# Patient Record
Sex: FEMALE | Race: WHITE | NOT HISPANIC OR LATINO | Employment: OTHER | URBAN - METROPOLITAN AREA
[De-identification: names, ages, dates, MRNs, and addresses within clinical notes are randomized per-mention and may not be internally consistent; named-entity substitution may affect disease eponyms.]

---

## 2018-02-03 ENCOUNTER — OFFICE VISIT (OUTPATIENT)
Dept: URGENT CARE | Facility: CLINIC | Age: 43
End: 2018-02-03
Payer: COMMERCIAL

## 2018-02-03 VITALS
HEART RATE: 103 BPM | HEIGHT: 64 IN | OXYGEN SATURATION: 98 % | DIASTOLIC BLOOD PRESSURE: 96 MMHG | BODY MASS INDEX: 31.92 KG/M2 | RESPIRATION RATE: 20 BRPM | TEMPERATURE: 98.7 F | SYSTOLIC BLOOD PRESSURE: 134 MMHG | WEIGHT: 187 LBS

## 2018-02-03 DIAGNOSIS — R05.9 COUGH: Primary | ICD-10-CM

## 2018-02-03 PROCEDURE — S9088 SERVICES PROVIDED IN URGENT: HCPCS

## 2018-02-03 PROCEDURE — 99203 OFFICE O/P NEW LOW 30 MIN: CPT

## 2018-02-03 NOTE — PROGRESS NOTES
[facility]        NAME: Chantell Strickland is a 38 y o  female  : 1975    MRN: [id number]  DATE: February 3, 2018  TIME: 12:07 PM    Assessment and Plan   No primary diagnosis found  No diagnosis found  Patient Instructions     Follow up with PCP in 3-5 days  Proceed to  ER if symptoms worsen  Chief Complaint     Chief Complaint   Patient presents with    Cold Like Symptoms     cough and decreased appetite for a little over a week  was put on a z pack one week ago, still having symptoms         History of Present Illness   Chantell Strickland presents to the clinic c/o    She presents today with her mother  Mom says she has had a cough for the last few days  No other complaints of fever sore throat headache chest congestion  Mom is concerned about possible strep throat  She has had some sick contacts  Review of Systems   Review of Systems   Constitutional: Negative  HENT: Negative  Eyes: Negative  Respiratory: Positive for cough  Negative for chest tightness, shortness of breath and wheezing  Cardiovascular: Negative  Gastrointestinal: Negative  Endocrine: Negative  Genitourinary: Negative  Musculoskeletal: Negative  Allergic/Immunologic: Negative  Neurological: Negative  Hematological: Negative  Current Medications     No long-term prescriptions on file         Current Allergies     Allergies as of 2018    (No Known Allergies)            The following portions of the patient's history were reviewed and updated as appropriate: allergies, current medications, past family history, past medical history, past social history, past surgical history and problem list     Objective   /96 (BP Location: Right arm, Patient Position: Sitting, Cuff Size: Adult)   Pulse 103   Temp 98 7 °F (37 1 °C) (Tympanic)   Resp 20   Ht 5' 4" (1 626 m)   Wt 84 8 kg (187 lb)   SpO2 98%   BMI 32 10 kg/m²        Physical Exam     Physical Exam Constitutional: She is oriented to person, place, and time  She appears well-developed  HENT:   Head: Normocephalic and atraumatic  Eyes: EOM are normal  Pupils are equal, round, and reactive to light  Neck: Normal range of motion  Cardiovascular: Normal rate and regular rhythm  Pulmonary/Chest: Effort normal and breath sounds normal    Abdominal: Soft  Bowel sounds are normal    Musculoskeletal: Normal range of motion  Neurological: She is alert and oriented to person, place, and time  Skin: Skin is warm and dry  Nursing note and vitals reviewed

## 2018-02-03 NOTE — PATIENT INSTRUCTIONS
I recommended that she continue supportive care rest and fluids  Follow-up if not a lot better in 3-5 days

## 2019-03-23 ENCOUNTER — APPOINTMENT (OUTPATIENT)
Dept: RADIOLOGY | Facility: CLINIC | Age: 44
End: 2019-03-23
Payer: COMMERCIAL

## 2019-03-23 ENCOUNTER — OFFICE VISIT (OUTPATIENT)
Dept: URGENT CARE | Facility: CLINIC | Age: 44
End: 2019-03-23
Payer: COMMERCIAL

## 2019-03-23 VITALS
BODY MASS INDEX: 30.9 KG/M2 | HEART RATE: 91 BPM | RESPIRATION RATE: 16 BRPM | OXYGEN SATURATION: 99 % | TEMPERATURE: 97.6 F | WEIGHT: 181 LBS | DIASTOLIC BLOOD PRESSURE: 88 MMHG | SYSTOLIC BLOOD PRESSURE: 126 MMHG | HEIGHT: 64 IN

## 2019-03-23 DIAGNOSIS — S80.01XA CONTUSION OF RIGHT KNEE, INITIAL ENCOUNTER: Primary | ICD-10-CM

## 2019-03-23 DIAGNOSIS — M25.561 ACUTE PAIN OF RIGHT KNEE: ICD-10-CM

## 2019-03-23 DIAGNOSIS — S20.212A RIB CONTUSION, LEFT, INITIAL ENCOUNTER: ICD-10-CM

## 2019-03-23 DIAGNOSIS — R07.81 RIB PAIN ON LEFT SIDE: ICD-10-CM

## 2019-03-23 PROCEDURE — 99213 OFFICE O/P EST LOW 20 MIN: CPT | Performed by: PHYSICIAN ASSISTANT

## 2019-03-23 PROCEDURE — 71101 X-RAY EXAM UNILAT RIBS/CHEST: CPT

## 2019-03-23 PROCEDURE — 73564 X-RAY EXAM KNEE 4 OR MORE: CPT

## 2019-03-23 PROCEDURE — S9088 SERVICES PROVIDED IN URGENT: HCPCS | Performed by: PHYSICIAN ASSISTANT

## 2019-03-23 RX ORDER — FLUOXETINE 20 MG/1
20 TABLET, FILM COATED ORAL DAILY
COMMUNITY

## 2019-03-23 NOTE — PROGRESS NOTES
[facility]        NAME: Navid Hess is a 41 y o  female  : 1975    MRN: [id number]  DATE: 2019  TIME: 1:57 PM    Assessment and Plan   Acute pain of right knee [M25 561]  1  Acute pain of right knee  XR knee 4+ vw right injury   2  Rib pain on left side  XR ribs left w pa chest min 3 views         Patient Instructions     RICE as instructed  Tylenol as needed for pain  Follow up with PCP in 3-5 days  Proceed to  ER if symptoms worsen  Chief Complaint     Chief Complaint   Patient presents with    Fall     hit furniture at 0500, bruising         History of Present Illness       Fall   Incident onset: 9 hours ago  The fall occurred while walking  Impact surface: fell into wooden bedside table  There was no blood loss  The point of impact was the head and right knee (left chest)  The pain is present in the right knee  The pain is moderate  The symptoms are aggravated by ambulation  Pertinent negatives include no abdominal pain, bowel incontinence, fever, headaches, hearing loss, hematuria, loss of consciousness, nausea, numbness, tingling, visual change or vomiting  Associated symptoms comments: Patient denies nausea, vomiting, dizziness, lightheadedness, weakness, numbness    She has tried nothing for the symptoms  Review of Systems   Review of Systems   Constitutional: Negative for activity change, appetite change, chills, diaphoresis, fatigue, fever and unexpected weight change  Respiratory: Negative for apnea, cough, choking, chest tightness, shortness of breath, wheezing and stridor  Cardiovascular: Negative for chest pain, palpitations and leg swelling  Gastrointestinal: Negative for abdominal pain, bowel incontinence, nausea and vomiting  Genitourinary: Negative for hematuria  Musculoskeletal: Positive for arthralgias and myalgias  Negative for back pain, gait problem, joint swelling, neck pain and neck stiffness     Neurological: Negative for tingling, loss of consciousness, numbness and headaches  Current Medications       Current Outpatient Medications:     FLUoxetine (PROzac) 20 MG tablet, Take 20 mg by mouth daily, Disp: , Rfl:     Levothyroxine Sodium (SYNTHROID PO), Take by mouth, Disp: , Rfl:     Topiramate (TOPAMAX PO), Take by mouth, Disp: , Rfl:     Current Allergies     Allergies as of 03/23/2019    (No Known Allergies)            The following portions of the patient's history were reviewed and updated as appropriate: allergies, current medications, past family history, past medical history, past social history, past surgical history and problem list      Past Medical History:   Diagnosis Date    Anxiety     Depression     Hypothyroid     Mental retardation        Past Surgical History:   Procedure Laterality Date    HERNIA REPAIR      NO PAST SURGERIES         No family history on file  Medications have been verified  Objective   /88   Pulse 91   Temp 97 6 °F (36 4 °C) (Tympanic)   Resp 16   Ht 5' 4" (1 626 m)   Wt 82 1 kg (181 lb)   LMP 03/01/2019 (Approximate) Comment: birth control  SpO2 99%   BMI 31 07 kg/m²        Physical Exam     Physical Exam   Constitutional: She appears well-developed and well-nourished  Cardiovascular: Normal rate, regular rhythm and normal heart sounds  Exam reveals no gallop and no friction rub  No murmur heard  Pulmonary/Chest: Effort normal and breath sounds normal  No stridor  No respiratory distress  She has no wheezes  She has no rales  Abdominal: Soft  Bowel sounds are normal  She exhibits no distension and no mass  There is no tenderness  There is no rebound and no guarding  Musculoskeletal:        Right knee: She exhibits ecchymosis  She exhibits normal range of motion, no swelling, no effusion, no deformity, no laceration, no erythema, normal alignment, no LCL laxity, normal patellar mobility, no bony tenderness, normal meniscus and no MCL laxity   Tenderness (to distal quad overlying 5cm blue ecchymosis) found     TTP overlying blue ecchymosis to left breast at level of 4th rib

## 2019-06-04 ENCOUNTER — OFFICE VISIT (OUTPATIENT)
Dept: URGENT CARE | Facility: CLINIC | Age: 44
End: 2019-06-04
Payer: COMMERCIAL

## 2019-06-04 ENCOUNTER — APPOINTMENT (OUTPATIENT)
Dept: RADIOLOGY | Facility: CLINIC | Age: 44
End: 2019-06-04
Payer: COMMERCIAL

## 2019-06-04 VITALS
SYSTOLIC BLOOD PRESSURE: 166 MMHG | HEART RATE: 104 BPM | HEIGHT: 64 IN | DIASTOLIC BLOOD PRESSURE: 106 MMHG | TEMPERATURE: 96.8 F | OXYGEN SATURATION: 98 % | WEIGHT: 180 LBS | RESPIRATION RATE: 18 BRPM | BODY MASS INDEX: 30.73 KG/M2

## 2019-06-04 DIAGNOSIS — S93.402A SPRAIN OF LEFT ANKLE, UNSPECIFIED LIGAMENT, INITIAL ENCOUNTER: Primary | ICD-10-CM
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
## 2019-06-04 PROCEDURE — 73610 X-RAY EXAM OF ANKLE: CPT

## 2019-06-04 PROCEDURE — S9088 SERVICES PROVIDED IN URGENT: HCPCS | Performed by: PHYSICIAN ASSISTANT

## 2019-06-04 PROCEDURE — 99213 OFFICE O/P EST LOW 20 MIN: CPT | Performed by: PHYSICIAN ASSISTANT

## 2019-06-04 RX ORDER — ELASTIC BANDAGE 3"
BANDAGE TOPICAL
Qty: 1 EACH | Refills: 0 | Status: SHIPPED | COMMUNITY
Start: 2019-06-04 | End: 2022-05-31

## 2019-06-04 RX ORDER — LEVOTHYROXINE SODIUM 0.1 MG/1
100 TABLET ORAL DAILY
Refills: 3 | COMMUNITY
Start: 2019-05-04

## 2019-06-04 RX ORDER — NORETHINDRONE ACETATE AND ETHINYL ESTRADIOL AND FERROUS FUMARATE 1.5-30(21)
1 KIT ORAL DAILY
Refills: 1 | COMMUNITY
Start: 2019-05-13

## 2019-06-04 RX ORDER — TOPIRAMATE 50 MG/1
TABLET, FILM COATED ORAL
Refills: 5 | COMMUNITY
Start: 2019-05-12 | End: 2019-06-04

## 2019-06-04 RX ORDER — FLUOXETINE HYDROCHLORIDE 20 MG/1
CAPSULE ORAL
Refills: 0 | COMMUNITY
Start: 2019-04-07 | End: 2019-06-04

## 2021-04-07 ENCOUNTER — IMMUNIZATIONS (OUTPATIENT)
Dept: FAMILY MEDICINE CLINIC | Facility: HOSPITAL | Age: 46
End: 2021-04-07

## 2021-04-07 DIAGNOSIS — Z23 ENCOUNTER FOR IMMUNIZATION: Primary | ICD-10-CM

## 2021-04-07 PROCEDURE — 91300 SARS-COV-2 / COVID-19 MRNA VACCINE (PFIZER-BIONTECH) 30 MCG: CPT

## 2021-04-07 PROCEDURE — 0001A SARS-COV-2 / COVID-19 MRNA VACCINE (PFIZER-BIONTECH) 30 MCG: CPT

## 2021-04-28 ENCOUNTER — IMMUNIZATIONS (OUTPATIENT)
Dept: FAMILY MEDICINE CLINIC | Facility: HOSPITAL | Age: 46
End: 2021-04-28

## 2021-04-28 DIAGNOSIS — Z23 ENCOUNTER FOR IMMUNIZATION: Primary | ICD-10-CM

## 2021-04-28 PROCEDURE — 0002A SARS-COV-2 / COVID-19 MRNA VACCINE (PFIZER-BIONTECH) 30 MCG: CPT

## 2021-04-28 PROCEDURE — 91300 SARS-COV-2 / COVID-19 MRNA VACCINE (PFIZER-BIONTECH) 30 MCG: CPT

## 2021-10-15 ENCOUNTER — OFFICE VISIT (OUTPATIENT)
Dept: ENDOCRINOLOGY | Facility: CLINIC | Age: 46
End: 2021-10-15
Payer: COMMERCIAL

## 2021-10-15 ENCOUNTER — APPOINTMENT (OUTPATIENT)
Dept: LAB | Facility: CLINIC | Age: 46
End: 2021-10-15
Payer: COMMERCIAL

## 2021-10-15 VITALS
BODY MASS INDEX: 34.48 KG/M2 | HEART RATE: 81 BPM | DIASTOLIC BLOOD PRESSURE: 88 MMHG | HEIGHT: 62 IN | WEIGHT: 187.4 LBS | SYSTOLIC BLOOD PRESSURE: 122 MMHG

## 2021-10-15 DIAGNOSIS — E66.09 CLASS 1 OBESITY DUE TO EXCESS CALORIES WITHOUT SERIOUS COMORBIDITY WITH BODY MASS INDEX (BMI) OF 34.0 TO 34.9 IN ADULT: ICD-10-CM

## 2021-10-15 DIAGNOSIS — R63.1 POLYDIPSIA: Primary | ICD-10-CM

## 2021-10-15 DIAGNOSIS — E03.9 HYPOTHYROIDISM, UNSPECIFIED TYPE: ICD-10-CM

## 2021-10-15 PROBLEM — E66.811 CLASS 1 OBESITY DUE TO EXCESS CALORIES WITHOUT SERIOUS COMORBIDITY WITH BODY MASS INDEX (BMI) OF 34.0 TO 34.9 IN ADULT: Status: ACTIVE | Noted: 2021-10-15

## 2021-10-15 LAB
ANION GAP SERPL CALCULATED.3IONS-SCNC: 6 MMOL/L (ref 4–13)
BUN SERPL-MCNC: 8 MG/DL (ref 5–25)
CALCIUM SERPL-MCNC: 8.9 MG/DL (ref 8.3–10.1)
CHLORIDE SERPL-SCNC: 112 MMOL/L (ref 100–108)
CO2 SERPL-SCNC: 20 MMOL/L (ref 21–32)
CREAT SERPL-MCNC: 0.88 MG/DL (ref 0.6–1.3)
EST. AVERAGE GLUCOSE BLD GHB EST-MCNC: 100 MG/DL
GFR SERPL CREATININE-BSD FRML MDRD: 80 ML/MIN/1.73SQ M
GLUCOSE SERPL-MCNC: 106 MG/DL (ref 65–140)
HBA1C MFR BLD: 5.1 %
POTASSIUM SERPL-SCNC: 3.2 MMOL/L (ref 3.5–5.3)
SODIUM SERPL-SCNC: 138 MMOL/L (ref 136–145)

## 2021-10-15 PROCEDURE — 80048 BASIC METABOLIC PNL TOTAL CA: CPT

## 2021-10-15 PROCEDURE — 36415 COLL VENOUS BLD VENIPUNCTURE: CPT

## 2021-10-15 PROCEDURE — 99243 OFF/OP CNSLTJ NEW/EST LOW 30: CPT | Performed by: STUDENT IN AN ORGANIZED HEALTH CARE EDUCATION/TRAINING PROGRAM

## 2021-10-15 PROCEDURE — 83036 HEMOGLOBIN GLYCOSYLATED A1C: CPT

## 2021-10-18 ENCOUNTER — TELEPHONE (OUTPATIENT)
Dept: ENDOCRINOLOGY | Facility: CLINIC | Age: 46
End: 2021-10-18

## 2022-02-11 ENCOUNTER — OFFICE VISIT (OUTPATIENT)
Dept: ENDOCRINOLOGY | Facility: CLINIC | Age: 47
End: 2022-02-11
Payer: COMMERCIAL

## 2022-02-11 VITALS
BODY MASS INDEX: 33.19 KG/M2 | HEIGHT: 64 IN | SYSTOLIC BLOOD PRESSURE: 126 MMHG | WEIGHT: 194.4 LBS | HEART RATE: 89 BPM | DIASTOLIC BLOOD PRESSURE: 62 MMHG

## 2022-02-11 DIAGNOSIS — E87.6 HYPOKALEMIA: ICD-10-CM

## 2022-02-11 DIAGNOSIS — E66.09 CLASS 1 OBESITY DUE TO EXCESS CALORIES WITHOUT SERIOUS COMORBIDITY WITH BODY MASS INDEX (BMI) OF 34.0 TO 34.9 IN ADULT: ICD-10-CM

## 2022-02-11 DIAGNOSIS — E03.9 HYPOTHYROIDISM, UNSPECIFIED TYPE: Primary | ICD-10-CM

## 2022-02-11 PROCEDURE — 99214 OFFICE O/P EST MOD 30 MIN: CPT | Performed by: STUDENT IN AN ORGANIZED HEALTH CARE EDUCATION/TRAINING PROGRAM

## 2022-02-11 NOTE — PROGRESS NOTES
Estrellita Monreal 42 y o  female MRN: [id number]    Encounter: [id number]      Assessment/Plan     Problem List Items Addressed This Visit        Endocrine    Hypothyroidism - Primary     Clinically and biochemically euthyroid on present dose of levothyroxine 100 mcg daily  Will repeat thyroid labs prior to next visit         Relevant Orders    T4, free Lab Collect    TSH, 3rd generation Lab Collect       Other    Class 1 obesity due to excess calories without serious comorbidity with body mass index (BMI) of 34 0 to 34 9 in adult     Counseled patient on benefits of physical activity, particularly as it pertains to health and mood  Hypokalemia     Mild hypokalemia x1  Will follow up repeat chemistry         Relevant Orders    Basic metabolic panel Lab Collect            CC: Hypothyroidism    History of Present Illness     HPI:    Ashley Gale returns today for follow up  As usual, she is joined by her mom  Ashley Gale continues on levothyroxine 100 mcg daily  She denies any hyper- or hypothyroid symptoms She denies any neck complaints either  She remains out of work and this has been hard on her  She spends most for day by herself and has not done much to stay active  She is hoping she may find some more structure to her day  Review of Systems   Constitutional: Negative for fatigue and fever  HENT: Negative for trouble swallowing and voice change  Eyes: Negative for photophobia and visual disturbance  Respiratory: Negative for cough and shortness of breath  Cardiovascular: Negative for chest pain, palpitations and leg swelling  Gastrointestinal: Negative for abdominal pain and diarrhea  Endocrine: Negative for cold intolerance and heat intolerance  Skin: Negative for color change and pallor  Neurological: Negative for tremors, weakness and headaches  Psychiatric/Behavioral: Negative for agitation and behavioral problems         Historical Information   Past Medical History:   Diagnosis Date    Anxiety     Depression     Hypothyroid     Mental retardation      Past Surgical History:   Procedure Laterality Date    HERNIA REPAIR      NO PAST SURGERIES       Social History   Social History     Substance and Sexual Activity   Alcohol Use Never     Social History     Substance and Sexual Activity   Drug Use Never     Social History     Tobacco Use   Smoking Status Never Smoker   Smokeless Tobacco Never Used     Family History:   Family History   Problem Relation Age of Onset    Stroke Father     Hypertension Maternal Grandmother     Hypertension Maternal Grandfather        Meds/Allergies   Current Outpatient Medications   Medication Sig Dispense Refill    Elastic Bandages & Supports (ACE BANDAGE SELF-ADHERING) MISC Placed over L foot and ankle  1 each 0    FLUoxetine (PROzac) 20 MG tablet Take 20 mg by mouth daily      JUNEL FE 1 5/30 1 5-30 MG-MCG tablet Take 1 tablet by mouth daily  1    levothyroxine 100 mcg tablet Take 100 mcg by mouth daily  3    Topiramate (TOPAMAX PO) Take 50 mg by mouth 2 (two) times a day        No current facility-administered medications for this visit  No Known Allergies    Objective   Vitals: Blood pressure 126/62, pulse 89, height 5' 4" (1 626 m), weight 88 2 kg (194 lb 6 4 oz)  Physical Exam  Vitals reviewed  Constitutional:       Appearance: She is not ill-appearing  HENT:      Head: Normocephalic and atraumatic  Nose: Nose normal    Eyes:      General: No scleral icterus  Conjunctiva/sclera: Conjunctivae normal    Neck:      Thyroid: No thyroid mass, thyromegaly or thyroid tenderness  Cardiovascular:      Rate and Rhythm: Normal rate and regular rhythm  Pulses: Normal pulses  Heart sounds: No murmur heard  Pulmonary:      Effort: Pulmonary effort is normal  No respiratory distress  Abdominal:      Palpations: Abdomen is soft  Tenderness: There is no abdominal tenderness     Musculoskeletal:      Cervical back: Neck supple  Lymphadenopathy:      Cervical: No cervical adenopathy  Skin:     General: Skin is warm and dry  Neurological:      Mental Status: She is alert  Psychiatric:         Mood and Affect: Mood normal          Behavior: Behavior normal          The history was obtained from the review of the chart, patient and family  Lab Results:       Component      Latest Ref Rng & Units 10/15/2021   Sodium      136 - 145 mmol/L 138   Potassium      3 5 - 5 3 mmol/L 3 2 (L)   Chloride      100 - 108 mmol/L 112 (H)   CO2      21 - 32 mmol/L 20 (L)   Anion Gap      4 - 13 mmol/L 6   BUN      5 - 25 mg/dL 8   Creatinine      0 60 - 1 30 mg/dL 0 88   Glucose, Random      65 - 140 mg/dL 106   Calcium      8 3 - 10 1 mg/dL 8 9   eGFR      ml/min/1 73sq m 80   Hemoglobin A1C      Normal 3 8-5 6%; PreDiabetic 5 7-6 4%; Diabetic >=6 5%; Glycemic control for adults with diabetes <7 0% % 5 1   eAG, EST AVG Glucose      mg/dl 100      Ref Range & Units 9/8/21  2:24 PM   Thyroid Stimulating Hormone 0 36 - 3 74 uIU/mL 1 89       Ref Range & Units 9/8/21  2:24 PM   T4, Free 0 76 - 1 46 ng/dL 1 14        Imaging Studies:       I have personally reviewed pertinent reports  Portions of the record may have been created with voice recognition software  Occasional wrong word or "sound a like" substitutions may have occurred due to the inherent limitations of voice recognition software  Read the chart carefully and recognize, using context, where substitutions have occurred

## 2022-02-11 NOTE — ASSESSMENT & PLAN NOTE
Clinically and biochemically euthyroid on present dose of levothyroxine 100 mcg daily   Will repeat thyroid labs prior to next visit

## 2022-05-31 ENCOUNTER — APPOINTMENT (EMERGENCY)
Dept: RADIOLOGY | Facility: HOSPITAL | Age: 47
End: 2022-05-31
Payer: COMMERCIAL

## 2022-05-31 ENCOUNTER — HOSPITAL ENCOUNTER (EMERGENCY)
Facility: HOSPITAL | Age: 47
Discharge: ANOTHER HOSPITAL | End: 2022-05-31
Attending: STUDENT IN AN ORGANIZED HEALTH CARE EDUCATION/TRAINING PROGRAM | Admitting: STUDENT IN AN ORGANIZED HEALTH CARE EDUCATION/TRAINING PROGRAM
Payer: COMMERCIAL

## 2022-05-31 VITALS
SYSTOLIC BLOOD PRESSURE: 163 MMHG | TEMPERATURE: 97.1 F | RESPIRATION RATE: 18 BRPM | BODY MASS INDEX: 32.97 KG/M2 | HEIGHT: 64 IN | HEART RATE: 91 BPM | DIASTOLIC BLOOD PRESSURE: 87 MMHG | OXYGEN SATURATION: 99 % | WEIGHT: 193.12 LBS

## 2022-05-31 DIAGNOSIS — M65.9 FLEXOR TENOSYNOVITIS OF FINGER: Primary | ICD-10-CM

## 2022-05-31 LAB
ANION GAP SERPL CALCULATED.3IONS-SCNC: 10 MMOL/L (ref 4–13)
BASOPHILS # BLD AUTO: 0.03 THOUSANDS/ΜL (ref 0–0.1)
BASOPHILS NFR BLD AUTO: 0 % (ref 0–1)
BUN SERPL-MCNC: 6 MG/DL (ref 5–25)
CALCIUM SERPL-MCNC: 8.2 MG/DL (ref 8.3–10.1)
CHLORIDE SERPL-SCNC: 108 MMOL/L (ref 100–108)
CO2 SERPL-SCNC: 19 MMOL/L (ref 21–32)
CREAT SERPL-MCNC: 0.77 MG/DL (ref 0.6–1.3)
CRP SERPL QL: 37.4 MG/L
EOSINOPHIL # BLD AUTO: 0.19 THOUSAND/ΜL (ref 0–0.61)
EOSINOPHIL NFR BLD AUTO: 2 % (ref 0–6)
ERYTHROCYTE [DISTWIDTH] IN BLOOD BY AUTOMATED COUNT: 13.9 % (ref 11.6–15.1)
ERYTHROCYTE [SEDIMENTATION RATE] IN BLOOD: 58 MM/HOUR (ref 0–19)
FLUAV RNA RESP QL NAA+PROBE: NEGATIVE
FLUBV RNA RESP QL NAA+PROBE: NEGATIVE
GFR SERPL CREATININE-BSD FRML MDRD: 93 ML/MIN/1.73SQ M
GLUCOSE SERPL-MCNC: 87 MG/DL (ref 65–140)
HCT VFR BLD AUTO: 42.8 % (ref 34.8–46.1)
HGB BLD-MCNC: 13.7 G/DL (ref 11.5–15.4)
IMM GRANULOCYTES # BLD AUTO: 0.02 THOUSAND/UL (ref 0–0.2)
IMM GRANULOCYTES NFR BLD AUTO: 0 % (ref 0–2)
LYMPHOCYTES # BLD AUTO: 2.19 THOUSANDS/ΜL (ref 0.6–4.47)
LYMPHOCYTES NFR BLD AUTO: 21 % (ref 14–44)
MCH RBC QN AUTO: 29.9 PG (ref 26.8–34.3)
MCHC RBC AUTO-ENTMCNC: 32 G/DL (ref 31.4–37.4)
MCV RBC AUTO: 93 FL (ref 82–98)
MONOCYTES # BLD AUTO: 0.59 THOUSAND/ΜL (ref 0.17–1.22)
MONOCYTES NFR BLD AUTO: 6 % (ref 4–12)
NEUTROPHILS # BLD AUTO: 7.33 THOUSANDS/ΜL (ref 1.85–7.62)
NEUTS SEG NFR BLD AUTO: 71 % (ref 43–75)
NRBC BLD AUTO-RTO: 0 /100 WBCS
PLATELET # BLD AUTO: 216 THOUSANDS/UL (ref 149–390)
PMV BLD AUTO: 11.5 FL (ref 8.9–12.7)
POTASSIUM SERPL-SCNC: 3.8 MMOL/L (ref 3.5–5.3)
PROCALCITONIN SERPL-MCNC: <0.05 NG/ML
RBC # BLD AUTO: 4.58 MILLION/UL (ref 3.81–5.12)
RSV RNA RESP QL NAA+PROBE: NEGATIVE
SARS-COV-2 RNA RESP QL NAA+PROBE: NEGATIVE
SODIUM SERPL-SCNC: 137 MMOL/L (ref 136–145)
WBC # BLD AUTO: 10.35 THOUSAND/UL (ref 4.31–10.16)

## 2022-05-31 PROCEDURE — 80048 BASIC METABOLIC PNL TOTAL CA: CPT | Performed by: STUDENT IN AN ORGANIZED HEALTH CARE EDUCATION/TRAINING PROGRAM

## 2022-05-31 PROCEDURE — 86140 C-REACTIVE PROTEIN: CPT | Performed by: STUDENT IN AN ORGANIZED HEALTH CARE EDUCATION/TRAINING PROGRAM

## 2022-05-31 PROCEDURE — 0241U HB NFCT DS VIR RESP RNA 4 TRGT: CPT | Performed by: STUDENT IN AN ORGANIZED HEALTH CARE EDUCATION/TRAINING PROGRAM

## 2022-05-31 PROCEDURE — 99284 EMERGENCY DEPT VISIT MOD MDM: CPT | Performed by: STUDENT IN AN ORGANIZED HEALTH CARE EDUCATION/TRAINING PROGRAM

## 2022-05-31 PROCEDURE — 73140 X-RAY EXAM OF FINGER(S): CPT

## 2022-05-31 PROCEDURE — 96365 THER/PROPH/DIAG IV INF INIT: CPT

## 2022-05-31 PROCEDURE — 36415 COLL VENOUS BLD VENIPUNCTURE: CPT | Performed by: STUDENT IN AN ORGANIZED HEALTH CARE EDUCATION/TRAINING PROGRAM

## 2022-05-31 PROCEDURE — 96375 TX/PRO/DX INJ NEW DRUG ADDON: CPT

## 2022-05-31 PROCEDURE — 85652 RBC SED RATE AUTOMATED: CPT | Performed by: STUDENT IN AN ORGANIZED HEALTH CARE EDUCATION/TRAINING PROGRAM

## 2022-05-31 PROCEDURE — 99284 EMERGENCY DEPT VISIT MOD MDM: CPT

## 2022-05-31 PROCEDURE — 87040 BLOOD CULTURE FOR BACTERIA: CPT | Performed by: STUDENT IN AN ORGANIZED HEALTH CARE EDUCATION/TRAINING PROGRAM

## 2022-05-31 PROCEDURE — 84145 PROCALCITONIN (PCT): CPT | Performed by: STUDENT IN AN ORGANIZED HEALTH CARE EDUCATION/TRAINING PROGRAM

## 2022-05-31 PROCEDURE — 85025 COMPLETE CBC W/AUTO DIFF WBC: CPT | Performed by: STUDENT IN AN ORGANIZED HEALTH CARE EDUCATION/TRAINING PROGRAM

## 2022-05-31 RX ORDER — FENTANYL CITRATE 50 UG/ML
50 INJECTION, SOLUTION INTRAMUSCULAR; INTRAVENOUS ONCE
Status: COMPLETED | OUTPATIENT
Start: 2022-05-31 | End: 2022-05-31

## 2022-05-31 RX ORDER — CEFTRIAXONE 1 G/50ML
1000 INJECTION, SOLUTION INTRAVENOUS ONCE
Status: COMPLETED | OUTPATIENT
Start: 2022-05-31 | End: 2022-05-31

## 2022-05-31 RX ADMIN — FENTANYL CITRATE 50 MCG: 0.05 INJECTION, SOLUTION INTRAMUSCULAR; INTRAVENOUS at 07:05

## 2022-05-31 RX ADMIN — CEFTRIAXONE 1000 MG: 1 INJECTION, SOLUTION INTRAVENOUS at 06:43

## 2022-05-31 NOTE — ED PROVIDER NOTES
History  Chief Complaint   Patient presents with   Rachelle Snellen Sting     Patient stung by bee in right pointer finger  Increase in swelling, pain  History provided by:  Patient and parent  Hand Pain  Location:  Right index finger  Quality:  Throbbing  Severity:  Moderate  Onset quality:  Gradual  Duration:  3 days  Timing:  Constant  Progression:  Worsening  Chronicity:  New  Context:  Was stung on the tip of her right index finger by a bee  Relieved by:  Nothing  Worsened by:  Nothing  Ineffective treatments:  Motrin    72-year-old female  Presents to the emergency department with worsening right index finger pain  She states that this past Saturday, she was stung by a bee on the tip of her finger  Over the last 3 days, the redness/pain/swelling in her right index finger has worsened  She denies fever/chills  Has been taking Motrin without relief  Prior to Admission Medications   Prescriptions Last Dose Informant Patient Reported? Taking? FLUoxetine (PROzac) 20 MG tablet 5/30/2022 at Unknown time  Yes Yes   Sig: Take 20 mg by mouth daily   JUNEL FE 1 5/30 1 5-30 MG-MCG tablet 5/30/2022 at Unknown time  Yes Yes   Sig: Take 1 tablet by mouth daily   Topiramate (TOPAMAX PO) 5/30/2022 at Unknown time  Yes Yes   Sig: Take 50 mg by mouth 2 (two) times a day    levothyroxine 100 mcg tablet 5/30/2022 at Unknown time  Yes Yes   Sig: Take 100 mcg by mouth daily      Facility-Administered Medications: None       Past Medical History:   Diagnosis Date    Anxiety     Depression     Hypothyroid     Mental retardation        Past Surgical History:   Procedure Laterality Date    HERNIA REPAIR      NO PAST SURGERIES         Family History   Problem Relation Age of Onset    Stroke Father     Hypertension Maternal Grandmother     Hypertension Maternal Grandfather      I have reviewed and agree with the history as documented      E-Cigarette/Vaping    E-Cigarette Use Never User      E-Cigarette/Vaping Substances Social History     Tobacco Use    Smoking status: Never Smoker    Smokeless tobacco: Never Used   Vaping Use    Vaping Use: Never used   Substance Use Topics    Alcohol use: Never    Drug use: Never     Review of Systems    Physical Exam  Physical Exam  Vitals and nursing note reviewed  Exam conducted with a chaperone present  Constitutional:       General: She is in acute distress  Appearance: She is not ill-appearing or toxic-appearing  HENT:      Head: Normocephalic and atraumatic  Right Ear: External ear normal       Left Ear: External ear normal       Nose: No congestion or rhinorrhea  Eyes:      General:         Right eye: No discharge  Left eye: No discharge  Extraocular Movements: Extraocular movements intact  Conjunctiva/sclera: Conjunctivae normal    Cardiovascular:      Rate and Rhythm: Normal rate and regular rhythm  Pulses: Normal pulses  Heart sounds: Normal heart sounds  Pulmonary:      Effort: Pulmonary effort is normal  No respiratory distress  Breath sounds: Normal breath sounds  No stridor  No wheezing, rhonchi or rales  Chest:      Chest wall: No tenderness  Abdominal:      General: Bowel sounds are normal       Palpations: Abdomen is soft  Tenderness: There is no abdominal tenderness  There is no guarding or rebound  Musculoskeletal:         General: Swelling and tenderness present  No deformity or signs of injury  Comments: Circumferential swelling of the right index finger with associated erythema  The soft tissues are taut and the finger is held in flexion  There is tenderness to palpation along the flexor aspect of the right index finger  Skin:     General: Skin is warm and dry  Capillary Refill: Capillary refill takes less than 2 seconds  Coloration: Skin is not jaundiced or pale  Findings: Erythema present  No bruising, lesion or rash  Neurological:      General: No focal deficit present  Mental Status: She is alert  Mental status is at baseline  Cranial Nerves: No cranial nerve deficit  Sensory: No sensory deficit  Motor: No weakness  Vital Signs  ED Triage Vitals [05/31/22 0548]   Temperature Pulse Respirations Blood Pressure SpO2   (!) 97 1 °F (36 2 °C) 91 18 163/87 99 %      Temp Source Heart Rate Source Patient Position - Orthostatic VS BP Location FiO2 (%)   Temporal -- Lying Left arm --      Pain Score       --           Vitals:    05/31/22 0548   BP: 163/87   Pulse: 91   Patient Position - Orthostatic VS: Lying     ED Medications  Medications   vancomycin (VANCOCIN) 1250 mg in sodium chloride 0 9% 250 mL IVPB (has no administration in time range)   cefTRIAXone (ROCEPHIN) IVPB (premix in dextrose) 1,000 mg 50 mL (1,000 mg Intravenous New Bag 5/31/22 0643)   fentanyl citrate (PF) 100 MCG/2ML 50 mcg (has no administration in time range)     Diagnostic Studies  Results Reviewed     None             XR finger second digit-index RIGHT   ED Interpretation by Warden Betsy DO (05/31 6367)   Diffuse swelling of the right index finger  No osseous changes noted  Procedures  Procedures     ED Course  ED Course as of 05/31/22 1427   Tue May 31, 2022   4607 The patient and her mother were offered transfer to either [facility] or [facility]  They elected [facility] for Hand Surgery evaluation  MDM     41year old F  presents to the emergency department with worsening erythema/pain/swelling of her right index finger  Clinical exam concerning for flexor tenosynovitis  Laboratory workup significant for leukocytosis, elevated ESR  Case was discussed with on-call orthopedic surgeon, Dr Adelia Sanchez, at [facility] who recommended ED to ED transfer  Transfer was accepted by Dr Ami Justin, ED attending  The patient was administered a dose of IV Ceftriaxone  Holding off on IV Vancomycin given its prolonged infusion time  OK for transport to [facility] via [facility]   Mom will drive  The patient was stable for discharge  Disposition  Final diagnoses:   Flexor tenosynovitis of finger     Time reflects when diagnosis was documented in both MDM as applicable and the Disposition within this note     Time User Action Codes Description Comment    5/31/2022  6:49 AM Duncan Prerna Add [M65 9] Flexor tenosynovitis of finger       ED Disposition     ED Disposition   Discharge    Condition   Stable    Date/Time   Tue May 31, 2022  6:50 AM    Comment   Sammie Valle should be transferred out to [facility]  Follow-up Information    None         Patient's Medications   Discharge Prescriptions    No medications on file       No discharge procedures on file      PDMP Review     None          ED Provider  Electronically Signed by           Charlene Benton DO  05/31/22 4573

## 2022-05-31 NOTE — ED NOTES
Pt to be transferred with IV site intact due to extremely hard stick, stockinette placed over site, pt exited ED with mother to report directly to [facility] ED, mother educated that pt to remain NPO, verbalized understanding of same, mother given all papers and disc, report called to "[employer]", report given       Yo Landry RN  05/31/22 8111

## 2022-06-05 LAB
BACTERIA BLD CULT: NORMAL
BACTERIA BLD CULT: NORMAL

## 2022-08-12 ENCOUNTER — APPOINTMENT (OUTPATIENT)
Dept: LAB | Facility: HOSPITAL | Age: 47
End: 2022-08-12
Attending: STUDENT IN AN ORGANIZED HEALTH CARE EDUCATION/TRAINING PROGRAM
Payer: COMMERCIAL

## 2022-08-12 DIAGNOSIS — E87.6 HYPOKALEMIA: ICD-10-CM

## 2022-08-12 DIAGNOSIS — E03.9 HYPOTHYROIDISM, UNSPECIFIED TYPE: ICD-10-CM

## 2022-08-12 LAB
ANION GAP SERPL CALCULATED.3IONS-SCNC: 11 MMOL/L (ref 4–13)
BUN SERPL-MCNC: 9 MG/DL (ref 5–25)
CALCIUM SERPL-MCNC: 8.8 MG/DL (ref 8.3–10.1)
CHLORIDE SERPL-SCNC: 105 MMOL/L (ref 96–108)
CO2 SERPL-SCNC: 20 MMOL/L (ref 21–32)
CREAT SERPL-MCNC: 0.88 MG/DL (ref 0.6–1.3)
GFR SERPL CREATININE-BSD FRML MDRD: 79 ML/MIN/1.73SQ M
GLUCOSE P FAST SERPL-MCNC: 75 MG/DL (ref 65–99)
POTASSIUM SERPL-SCNC: 3.9 MMOL/L (ref 3.5–5.3)
SODIUM SERPL-SCNC: 136 MMOL/L (ref 135–147)
T4 FREE SERPL-MCNC: 1.29 NG/DL (ref 0.76–1.46)
TSH SERPL DL<=0.05 MIU/L-ACNC: 2.64 UIU/ML (ref 0.45–4.5)

## 2022-08-12 PROCEDURE — 84443 ASSAY THYROID STIM HORMONE: CPT

## 2022-08-12 PROCEDURE — 36415 COLL VENOUS BLD VENIPUNCTURE: CPT

## 2022-08-12 PROCEDURE — 84439 ASSAY OF FREE THYROXINE: CPT

## 2022-08-12 PROCEDURE — 80048 BASIC METABOLIC PNL TOTAL CA: CPT

## 2022-08-19 ENCOUNTER — TELEPHONE (OUTPATIENT)
Dept: PSYCHIATRY | Facility: CLINIC | Age: 47
End: 2022-08-19

## 2022-08-19 ENCOUNTER — OFFICE VISIT (OUTPATIENT)
Dept: ENDOCRINOLOGY | Facility: CLINIC | Age: 47
End: 2022-08-19
Payer: COMMERCIAL

## 2022-08-19 VITALS
HEART RATE: 82 BPM | BODY MASS INDEX: 32.95 KG/M2 | DIASTOLIC BLOOD PRESSURE: 60 MMHG | HEIGHT: 64 IN | SYSTOLIC BLOOD PRESSURE: 130 MMHG | WEIGHT: 193 LBS

## 2022-08-19 DIAGNOSIS — E03.9 HYPOTHYROIDISM, UNSPECIFIED TYPE: Primary | ICD-10-CM

## 2022-08-19 DIAGNOSIS — E66.09 CLASS 1 OBESITY DUE TO EXCESS CALORIES WITHOUT SERIOUS COMORBIDITY WITH BODY MASS INDEX (BMI) OF 33.0 TO 33.9 IN ADULT: ICD-10-CM

## 2022-08-19 DIAGNOSIS — F39 MOOD DISORDER (HCC): ICD-10-CM

## 2022-08-19 DIAGNOSIS — R40.0 DAYTIME SOMNOLENCE: ICD-10-CM

## 2022-08-19 DIAGNOSIS — E87.6 HYPOKALEMIA: ICD-10-CM

## 2022-08-19 PROCEDURE — 99214 OFFICE O/P EST MOD 30 MIN: CPT | Performed by: STUDENT IN AN ORGANIZED HEALTH CARE EDUCATION/TRAINING PROGRAM

## 2022-08-19 RX ORDER — LEVOTHYROXINE SODIUM 0.1 MG/1
100 TABLET ORAL DAILY
Qty: 90 TABLET | Refills: 2 | Status: SHIPPED | OUTPATIENT
Start: 2022-08-19

## 2022-08-19 NOTE — ASSESSMENT & PLAN NOTE
Dayna Olivo has had some improvement in weight  Encouraged regular physical activity and healthy diet

## 2022-08-19 NOTE — ASSESSMENT & PLAN NOTE
Advised that a sleep study should be considered to rule out sleep disorder, including OSKAR  That option was declined today

## 2022-08-19 NOTE — PROGRESS NOTES
Kan Serna 41 y o  female MRN: [id number]    Encounter: [id number]      Assessment/Plan     Problem List Items Addressed This Visit        Endocrine    Hypothyroidism - Primary     Hypothyroidism is stable  Recommend continuing same dose of levothyroxine 100 mcg daily  Follow up repeat TFTs prior to next visit  Relevant Medications    levothyroxine 100 mcg tablet    Other Relevant Orders    T4, free Lab Collect    TSH, 3rd generation Lab Collect    Basic metabolic panel Lab Collect       Other    Class 1 obesity due to excess calories without serious comorbidity with body mass index (BMI) of 33 0 to 33 9 in adult     Elizabeth has had some improvement in weight  Encouraged regular physical activity and healthy diet  Hypokalemia     Hypokalemia resolved on most recent labs  Will monitor         Daytime somnolence     Advised that a sleep study should be considered to rule out sleep disorder, including OSKAR  That option was declined today  Other Visit Diagnoses     Mood disorder [facility])        Relevant Orders    Ambulatory Referral to Psychiatry          CC: Hypothyroidism    History of Present Illness     HPI:    Daksha Bermudez returns today for follow up  As usual, she is joined by her mother  Daksha Bermudez denies any significant interval changes in health  She is reading a lot and is also doing cross word puzzles  Daksha Bermudez continues to take levothyroxine 100 mcg daily  She takes her medication as prescribed  She denies any hyper- or hypothyroid symptoms aside from fatigue  She denies any compressive neck symptoms  Daksha Bermudez does report symptoms of daytime fatigue and at times insomnia  She has never had a sleep study  Her mother is worried that it may be related to her mental health and is inquiring about local psychiatrists  She continues to take topirimate for history of seizures  She is also on prozac for anxiety/depression  Review of Systems   Constitutional: Positive for fatigue  Negative for fever  HENT: Negative for trouble swallowing and voice change  Eyes: Negative for photophobia and visual disturbance  Respiratory: Negative for cough and shortness of breath  Cardiovascular: Negative for chest pain, palpitations and leg swelling  Gastrointestinal: Negative for abdominal pain and diarrhea  Endocrine: Negative for cold intolerance and heat intolerance  Skin: Negative for color change and pallor  Neurological: Negative for tremors, weakness and headaches  Psychiatric/Behavioral: Positive for sleep disturbance  Negative for agitation and behavioral problems  The patient is nervous/anxious  Historical Information   Past Medical History:   Diagnosis Date    Anxiety     Depression     Hypothyroid     Mental retardation      Past Surgical History:   Procedure Laterality Date    HERNIA REPAIR      NO PAST SURGERIES       Social History   Social History     Substance and Sexual Activity   Alcohol Use Never     Social History     Substance and Sexual Activity   Drug Use Never     Social History     Tobacco Use   Smoking Status Never Smoker   Smokeless Tobacco Never Used     Family History:   Family History   Problem Relation Age of Onset    Stroke Father     Hypertension Maternal Grandmother     Hypertension Maternal Grandfather        Meds/Allergies   Current Outpatient Medications   Medication Sig Dispense Refill    FLUoxetine (PROzac) 20 MG tablet Take 20 mg by mouth daily      JUNEL FE 1 5/30 1 5-30 MG-MCG tablet Take 1 tablet by mouth daily  1    levothyroxine 100 mcg tablet Take 1 tablet (100 mcg total) by mouth daily 90 tablet 2    Topiramate (TOPAMAX PO) Take 50 mg by mouth 2 (two) times a day        No current facility-administered medications for this visit  No Known Allergies    Objective   Vitals: Blood pressure 130/60, pulse 82, height 5' 4" (1 626 m), weight 87 5 kg (193 lb)  Physical Exam  Vitals reviewed     Constitutional: Appearance: She is not ill-appearing  HENT:      Head: Normocephalic and atraumatic  Nose: Nose normal    Eyes:      General: No scleral icterus  Conjunctiva/sclera: Conjunctivae normal    Neck:      Thyroid: No thyroid mass, thyromegaly or thyroid tenderness  Cardiovascular:      Rate and Rhythm: Normal rate and regular rhythm  Pulses: Normal pulses  Heart sounds: No murmur heard  Pulmonary:      Effort: Pulmonary effort is normal  No respiratory distress  Abdominal:      Palpations: Abdomen is soft  Tenderness: There is no abdominal tenderness  Musculoskeletal:      Cervical back: Neck supple  Lymphadenopathy:      Cervical: No cervical adenopathy  Skin:     General: Skin is warm and dry  Neurological:      Mental Status: She is alert  Psychiatric:         Mood and Affect: Mood normal          Behavior: Behavior normal          The history was obtained from the review of the chart, patient and family  Lab Results:   Lab Results   Component Value Date/Time    TSH 3RD GENERATON 2 639 08/12/2022 01:44 PM    Free T4 1 29 08/12/2022 01:44 PM      Lab Results   Component Value Date    SODIUM 136 08/12/2022    K 3 9 08/12/2022     08/12/2022    CO2 20 (L) 08/12/2022    BUN 9 08/12/2022    CREATININE 0 88 08/12/2022    GLUC 87 05/31/2022    CALCIUM 8 8 08/12/2022          Ref Range & Units 9/8/21  2:24 PM   Thyroid Stimulating Hormone 0 36 - 3 74 uIU/mL 1 89       Ref Range & Units 9/8/21  2:24 PM   T4, Free 0 76 - 1 46 ng/dL 1 14        Imaging Studies:       I have personally reviewed pertinent reports  Portions of the record may have been created with voice recognition software  Occasional wrong word or "sound a like" substitutions may have occurred due to the inherent limitations of voice recognition software  Read the chart carefully and recognize, using context, where substitutions have occurred

## 2022-08-19 NOTE — ASSESSMENT & PLAN NOTE
Hypothyroidism is stable  Recommend continuing same dose of levothyroxine 100 mcg daily  Follow up repeat TFTs prior to next visit

## 2022-08-19 NOTE — TELEPHONE ENCOUNTER
[phone] ask if she has her hearing aid on    contacted patient in regards to referral in attempts to add patient to proper waitlist  spoke  w  pt mother whom provided pt telephone number [phone] unable to lvm mailbox not set up

## 2022-08-24 ENCOUNTER — TELEPHONE (OUTPATIENT)
Dept: PSYCHIATRY | Facility: CLINIC | Age: 47
End: 2022-08-24

## 2022-08-31 ENCOUNTER — TELEPHONE (OUTPATIENT)
Dept: PSYCHIATRY | Facility: CLINIC | Age: 47
End: 2022-08-31

## 2022-08-31 NOTE — TELEPHONE ENCOUNTER
contacted patient in regards to referral in attempts to add patient to proper wailtist  used number give by mother in previous encounter to contact pt  unable to lvm due to vm box not setup  Contacted main number ([phone]) on file which is pt mother number left intake number w  Mother for pt to contact intake  Pt does have hearing problems and will be confused on call according to mother  Writer told mother that patient can give verbal conset to speak w  Mother on her behalf    Mother will pass message along to pt          [phone]

## 2023-03-14 ENCOUNTER — APPOINTMENT (OUTPATIENT)
Dept: LAB | Facility: HOSPITAL | Age: 48
End: 2023-03-14

## 2023-03-14 DIAGNOSIS — E03.9 HYPOTHYROIDISM, UNSPECIFIED TYPE: ICD-10-CM

## 2023-03-14 LAB
ANION GAP SERPL CALCULATED.3IONS-SCNC: 10 MMOL/L (ref 4–13)
BUN SERPL-MCNC: 9 MG/DL (ref 5–25)
CALCIUM SERPL-MCNC: 10 MG/DL (ref 8.4–10.2)
CHLORIDE SERPL-SCNC: 110 MMOL/L (ref 96–108)
CO2 SERPL-SCNC: 20 MMOL/L (ref 21–32)
CREAT SERPL-MCNC: 0.76 MG/DL (ref 0.6–1.3)
GFR SERPL CREATININE-BSD FRML MDRD: 95 ML/MIN/1.73SQ M
GLUCOSE P FAST SERPL-MCNC: 100 MG/DL (ref 65–99)
POTASSIUM SERPL-SCNC: 4.2 MMOL/L (ref 3.5–5.3)
SODIUM SERPL-SCNC: 140 MMOL/L (ref 135–147)
T4 FREE SERPL-MCNC: 1.36 NG/DL (ref 0.76–1.46)
TSH SERPL DL<=0.05 MIU/L-ACNC: 1.6 UIU/ML (ref 0.45–4.5)

## 2023-03-28 ENCOUNTER — APPOINTMENT (OUTPATIENT)
Dept: LAB | Facility: HOSPITAL | Age: 48
End: 2023-03-28

## 2023-03-28 ENCOUNTER — LAB (OUTPATIENT)
Dept: LAB | Facility: HOSPITAL | Age: 48
End: 2023-03-28

## 2023-03-28 DIAGNOSIS — Z51.81 ENCOUNTER FOR THERAPEUTIC DRUG MONITORING: ICD-10-CM

## 2023-03-28 LAB
CHOLEST SERPL-MCNC: 223 MG/DL
GLUCOSE P FAST SERPL-MCNC: 97 MG/DL (ref 65–99)
HDLC SERPL-MCNC: 64 MG/DL
LDLC SERPL CALC-MCNC: 145 MG/DL (ref 0–100)
NONHDLC SERPL-MCNC: 159 MG/DL
TRIGL SERPL-MCNC: 69 MG/DL

## 2023-03-29 LAB
ATRIAL RATE: 120 BPM
P AXIS: 36 DEGREES
PR INTERVAL: 156 MS
QRS AXIS: 26 DEGREES
QRSD INTERVAL: 82 MS
QT INTERVAL: 312 MS
QTC INTERVAL: 440 MS
T WAVE AXIS: 12 DEGREES
VENTRICULAR RATE: 120 BPM

## 2023-03-30 LAB — 1,25(OH)2D3 SERPL-MCNC: 22.2 PG/ML (ref 24.8–81.5)

## 2023-03-31 ENCOUNTER — OFFICE VISIT (OUTPATIENT)
Dept: ENDOCRINOLOGY | Facility: CLINIC | Age: 48
End: 2023-03-31

## 2023-03-31 VITALS
HEART RATE: 117 BPM | WEIGHT: 188.2 LBS | SYSTOLIC BLOOD PRESSURE: 118 MMHG | HEIGHT: 64 IN | DIASTOLIC BLOOD PRESSURE: 96 MMHG | BODY MASS INDEX: 32.13 KG/M2

## 2023-03-31 DIAGNOSIS — E66.09 CLASS 1 OBESITY DUE TO EXCESS CALORIES WITHOUT SERIOUS COMORBIDITY WITH BODY MASS INDEX (BMI) OF 34.0 TO 34.9 IN ADULT: ICD-10-CM

## 2023-03-31 DIAGNOSIS — E03.9 HYPOTHYROIDISM, UNSPECIFIED TYPE: Primary | ICD-10-CM

## 2023-03-31 DIAGNOSIS — F39 MOOD DISORDER (HCC): ICD-10-CM

## 2023-03-31 RX ORDER — LEVOTHYROXINE SODIUM 0.1 MG/1
100 TABLET ORAL DAILY
Qty: 90 TABLET | Refills: 2 | Status: SHIPPED | OUTPATIENT
Start: 2023-03-31

## 2023-03-31 RX ORDER — DULOXETIN HYDROCHLORIDE 60 MG/1
60 CAPSULE, DELAYED RELEASE ORAL DAILY
COMMUNITY

## 2023-03-31 RX ORDER — OLANZAPINE 2.5 MG/1
2.5 TABLET ORAL
COMMUNITY

## 2023-03-31 NOTE — PROGRESS NOTES
Ryland Grider 41 y o  female MRN: [id number]    Encounter: [id number]      Assessment/Plan     1  Hypothyroidism - stable  Adam Shelton may continue present dosing of levothyroxine 100 mcg daily  We will arrange for repeat thyroid testing in 12-months, earlier as needed for symptoms  She is aware I may be reached at any time with concerns  2  Hypokalemia - seen on prior labs, now stable  Not on potassium replacement    Problem List Items Addressed This Visit        Endocrine    Hypothyroidism - Primary    Relevant Medications    levothyroxine 100 mcg tablet    Other Relevant Orders    T4, free Lab Collect    TSH, 3rd generation Lab Collect   Other Visit Diagnoses     Mood disorder ([facility] [address] )        Relevant Medications    OLANZapine (ZyPREXA) 2 5 mg tablet    DULoxetine (CYMBALTA) 60 mg delayed release capsule    Class 1 obesity due to excess calories without serious comorbidity with body mass index (BMI) of 34 0 to 34 9 in adult            RTC 12-mo    CC: Hypothyroidism    History of Present Illness     HPI:    Adam Shelton returns today for follow up  She is joined by family friend  Adam Shelton reports no acute concerns today, other than noting a recent shoulder injury she experienced  A shoulder Xray was normal      Elizabeth continues to take levothyroxine 100 mcg daily  She takes her medication as prescribed  She denies any hyper- or hypothyroid symptoms  She denies any compressive neck symptoms  She is following with new providers now, and has had some changes to medications  She is no longer taking prozac, but is now prescribed cymbalta 60 mg daily  She is also taking olanzapine 2 5 mg HS and topiramate 50 mg bid  Review of Systems   Constitutional: Positive for fatigue  HENT: Negative for trouble swallowing and voice change  Gastrointestinal: Negative for nausea and vomiting  Musculoskeletal: Positive for arthralgias (right shoulder)  Neurological: Positive for tremors (occasional)     Psychiatric/Behavioral: "Negative for agitation  All other systems reviewed and are negative  Historical Information   Past Medical History:   Diagnosis Date   • Anxiety    • Depression    • Hypothyroid    • Mental retardation      Past Surgical History:   Procedure Laterality Date   • HERNIA REPAIR     • NO PAST SURGERIES       Social History   Social History     Substance and Sexual Activity   Alcohol Use Never     Social History     Substance and Sexual Activity   Drug Use Never     Social History     Tobacco Use   Smoking Status Never   Smokeless Tobacco Never     Family History:   Family History   Problem Relation Age of Onset   • Stroke Father    • Hypertension Maternal Grandmother    • Hypertension Maternal Grandfather        Meds/Allergies   Current Outpatient Medications   Medication Sig Dispense Refill   • DULoxetine (CYMBALTA) 60 mg delayed release capsule Take 60 mg by mouth daily     • JUNEL FE 1 5/30 1 5-30 MG-MCG tablet Take 1 tablet by mouth daily  1   • levothyroxine 100 mcg tablet Take 1 tablet (100 mcg total) by mouth daily 90 tablet 2   • OLANZapine (ZyPREXA) 2 5 mg tablet Take 2 5 mg by mouth daily at bedtime     • Topiramate (TOPAMAX PO) Take 50 mg by mouth 2 (two) times a day      • FLUoxetine (PROzac) 20 MG tablet Take 20 mg by mouth daily (Patient not taking: Reported on 3/31/2023)       No current facility-administered medications for this visit  No Known Allergies    Objective   Vitals: Blood pressure 118/96, pulse (!) 117, height 5' 4\" (1 626 m), weight 85 4 kg (188 lb 3 2 oz)  Physical Exam  Vitals reviewed  Constitutional:       General: She is not in acute distress  Appearance: She is not ill-appearing  HENT:      Head: Normocephalic and atraumatic  Nose: Nose normal    Eyes:      Conjunctiva/sclera: Conjunctivae normal    Neck:      Thyroid: No thyroid mass, thyromegaly or thyroid tenderness  Cardiovascular:      Rate and Rhythm: Normal rate and regular rhythm        Pulses: Normal " "pulses  Heart sounds: No murmur heard  Pulmonary:      Effort: Pulmonary effort is normal  No respiratory distress  Musculoskeletal:      Cervical back: Neck supple  Lymphadenopathy:      Cervical: No cervical adenopathy  Skin:     General: Skin is warm and dry  Neurological:      Mental Status: She is alert  Comments: No tremor to outstretched hands   Psychiatric:         Mood and Affect: Mood normal          Behavior: Behavior normal          The history was obtained from the review of the chart, patient and family  Lab Results:   Lab Results   Component Value Date/Time    TSH 3RD GENERATON 1 603 03/14/2023 11:39 AM    TSH 3RD GENERATON 2 639 08/12/2022 01:44 PM    Free T4 1 36 03/14/2023 11:39 AM    Free T4 1 29 08/12/2022 01:44 PM      Lab Results   Component Value Date    SODIUM 140 03/14/2023    K 4 2 03/14/2023     (H) 03/14/2023    CO2 20 (L) 03/14/2023    BUN 9 03/14/2023    CREATININE 0 76 03/14/2023    GLUC 87 05/31/2022    CALCIUM 10 0 03/14/2023          Ref Range & Units 9/8/21  2:24 PM   Thyroid Stimulating Hormone 0 36 - 3 74 uIU/mL 1 89       Ref Range & Units 9/8/21  2:24 PM   T4, Free 0 76 - 1 46 ng/dL 1 14        Imaging Studies:   ?    I have personally reviewed pertinent reports  Portions of the record may have been created with voice recognition software  Occasional wrong word or \"sound a like\" substitutions may have occurred due to the inherent limitations of voice recognition software  Read the chart carefully and recognize, using context, where substitutions have occurred    "

## 2023-04-20 PROBLEM — G89.29 CHRONIC RIGHT SHOULDER PAIN: Status: ACTIVE | Noted: 2023-04-20

## 2023-04-20 PROBLEM — M75.01 ADHESIVE CAPSULITIS OF RIGHT SHOULDER: Status: ACTIVE | Noted: 2023-04-20

## 2023-04-20 PROBLEM — M25.511 CHRONIC RIGHT SHOULDER PAIN: Status: ACTIVE | Noted: 2023-04-20

## 2023-05-08 DIAGNOSIS — E03.9 HYPOTHYROIDISM, UNSPECIFIED TYPE: ICD-10-CM

## 2023-05-08 RX ORDER — LEVOTHYROXINE SODIUM 0.1 MG/1
100 TABLET ORAL DAILY
Qty: 90 TABLET | Refills: 3 | Status: SHIPPED | OUTPATIENT
Start: 2023-05-08

## 2023-05-17 DIAGNOSIS — E03.9 HYPOTHYROIDISM, UNSPECIFIED: ICD-10-CM

## 2023-05-17 DIAGNOSIS — R06.83 SNORING: ICD-10-CM

## 2023-05-17 DIAGNOSIS — F41.1 GENERALIZED ANXIETY DISORDER: ICD-10-CM

## 2023-05-17 DIAGNOSIS — Z87.898 PERSONAL HISTORY OF OTHER SPECIFIED CONDITIONS: ICD-10-CM

## 2023-05-17 DIAGNOSIS — R00.0 TACHYCARDIA, UNSPECIFIED: ICD-10-CM

## 2023-05-18 ENCOUNTER — OFFICE VISIT (OUTPATIENT)
Dept: OBGYN CLINIC | Facility: CLINIC | Age: 48
End: 2023-05-18

## 2023-05-18 VITALS
DIASTOLIC BLOOD PRESSURE: 90 MMHG | HEIGHT: 64 IN | SYSTOLIC BLOOD PRESSURE: 140 MMHG | HEART RATE: 115 BPM | TEMPERATURE: 97.6 F | WEIGHT: 187 LBS | BODY MASS INDEX: 31.92 KG/M2

## 2023-05-18 DIAGNOSIS — M75.01 ADHESIVE CAPSULITIS OF RIGHT SHOULDER: Primary | ICD-10-CM

## 2023-05-18 NOTE — PROGRESS NOTES
ASSESSMENT/PLAN:    Diagnoses and all orders for this visit:    Adhesive capsulitis of right shoulder    Plan: Discussed with patient, and her mother, that today's physical exam is consistent with persistent adhesive capsulitis  She is to continue with formal physical therapy to continue working and regaining range of motion  Reiterated to the patient that the prescription medication sent to the pharmacy was an anti-inflammatory that does not formulate addictive tendencies, and may further improve her overall degree of pain making physical therapy and home exercise program more tolerable  She will be seen for follow-up in 6 weeks for reevaluation and range of motion check  Patient, and her mother, express understanding and is in agreement with this treatment plan           _____________________________________________________  CHIEF COMPLAINT:  Chief Complaint   Patient presents with   • Follow-up         SUBJECTIVE:  Supa Farrell is a 41y o  year old female who presents for follow up of adhesive capsulitis of the right shoulder  She was last seen regresses issue on 4/20/2023, at which time she was referred to physical therapy to work on range of motion, she was also provided prescription for naproxen and provided a corticosteroid injection to decrease pain and inflammation  On today's presentation she reports significant pain improvement following the CS injection  She states that this lower her pain from 9/10 pain to 5-6/10  She reports that she has been consistent with formal physical therapy, and has seen significant improvement in her overall range of motion  She denies any new onset bruising, swelling, numbness, or tingling  She has been taking Tylenol as needed, but her mother admits that they did not fill the naproxen prescription as provided        PAST MEDICAL HISTORY:  Past Medical History:   Diagnosis Date   • Anxiety    • Depression    • Hypothyroid    • Mental retardation        PAST SURGICAL HISTORY:  Past Surgical History:   Procedure Laterality Date   • HERNIA REPAIR     • NO PAST SURGERIES         FAMILY HISTORY:  Family History   Problem Relation Age of Onset   • Stroke Father    • Hypertension Maternal Grandmother    • Hypertension Maternal Grandfather        SOCIAL HISTORY:  Social History     Tobacco Use   • Smoking status: Never   • Smokeless tobacco: Never   Vaping Use   • Vaping Use: Never used   Substance Use Topics   • Alcohol use: Never   • Drug use: Never       MEDICATIONS:    Current Outpatient Medications:   •  DULoxetine (CYMBALTA) 60 mg delayed release capsule, Take 60 mg by mouth daily, Disp: , Rfl:   •  ibuprofen (MOTRIN) 200 mg tablet, Take by mouth every 6 (six) hours as needed for mild pain, Disp: , Rfl:   •  JUNEL FE 1 5/30 1 5-30 MG-MCG tablet, Take 1 tablet by mouth daily, Disp: , Rfl: 1  •  levothyroxine 100 mcg tablet, Take 1 tablet (100 mcg total) by mouth daily, Disp: 90 tablet, Rfl: 3  •  naproxen (NAPROSYN) 500 mg tablet, Take 1 tablet (500 mg total) by mouth 2 (two) times a day with meals, Disp: 60 tablet, Rfl: 1  •  OLANZapine (ZyPREXA) 2 5 mg tablet, Take 2 5 mg by mouth daily at bedtime, Disp: , Rfl:   •  Topiramate (TOPAMAX PO), Take 50 mg by mouth 2 (two) times a day , Disp: , Rfl:   •  FLUoxetine (PROzac) 20 MG tablet, Take 20 mg by mouth daily (Patient not taking: Reported on 3/31/2023), Disp: , Rfl:     ALLERGIES:  No Known Allergies    REVIEW OF SYSTEMS:  Pertinent items are noted in HPI    A comprehensive review of systems was negative       _____________________________________________________  PHYSICAL EXAMINATION:  General: well developed and well nourished, alert, oriented times 3 and appears comfortable  Psychiatric: Normal  HEENT: Benign  Cardiovascular: Normal rate  Pulmonary: No wheezing or stridor  Skin: No masses, erythema, lacerations, fluctation, ulcerations  Neurovascular: Palpable distal pulses    MUSCULOSKELETAL EXAMINATION:  Right shoulder -   No anatomical deformity  Skin is warm and dry to touch with no signs of erythema, ecchymosis, or infection  No soft tissue swelling or effusion noted  No palpable crepitus with passive motion  Minimally TTP over anterior shoulder, minimally TTP over lateral deltoid/subacromial bursa, mildly TTP over posterior capsule  ROM FF 0° - 90° with capsular end feel, ABD 0° - 80° with capsular end feel, ER 0° - 30° with capsular end    MMT 4+/5 throughout  - glenohumeral instability appreciated on exam  - Neer's, - Latif  - Speed's, - Yergason's  - empty can, - drop-arm, - belly press, - resisted external rotation, - lift-off  Demonstrates normal elbow, wrist, and finger motion  2+ distal radial pulse with brisk capillary refill to the fingers  Radial, median, and ulnar motor and sensory distributions intact  Sensation to light touch intact distally          Scribe Attestation    I,:  Monica Stockton am acting as a scribe while in the presence of the attending physician :       I,:  Tito Ferrell personally performed the services described in this documentation    as scribed in my presence :

## 2023-06-03 ENCOUNTER — HOSPITAL ENCOUNTER (OUTPATIENT)
Dept: NON INVASIVE DIAGNOSTICS | Facility: HOSPITAL | Age: 48
Discharge: HOME | End: 2023-06-03
Payer: COMMERCIAL

## 2023-06-03 VITALS
HEART RATE: 90 BPM | HEIGHT: 64 IN | BODY MASS INDEX: 31.92 KG/M2 | DIASTOLIC BLOOD PRESSURE: 96 MMHG | WEIGHT: 187 LBS | SYSTOLIC BLOOD PRESSURE: 118 MMHG

## 2023-06-03 DIAGNOSIS — R06.83 SNORING: ICD-10-CM

## 2023-06-03 DIAGNOSIS — E03.9 HYPOTHYROIDISM, UNSPECIFIED: ICD-10-CM

## 2023-06-03 DIAGNOSIS — Z87.898 PERSONAL HISTORY OF OTHER SPECIFIED CONDITIONS: ICD-10-CM

## 2023-06-03 DIAGNOSIS — R00.0 TACHYCARDIA, UNSPECIFIED: ICD-10-CM

## 2023-06-03 DIAGNOSIS — F41.1 GENERALIZED ANXIETY DISORDER: ICD-10-CM

## 2023-06-03 LAB
AORTIC ROOT: 2.9 CM
APICAL FOUR CHAMBER EJECTION FRACTION: 45 %
AV REGURGITATION PRESSURE HALF TIME: 807 MS
E WAVE DECELERATION TIME: 179 MS
FRACTIONAL SHORTENING: 31 (ref 28–44)
INTERVENTRICULAR SEPTUM IN DIASTOLE (PARASTERNAL SHORT AXIS VIEW): 1.4 CM
INTERVENTRICULAR SEPTUM: 1.4 CM (ref 0.6–1.1)
LAAS-AP2: 14.8 CM2
LAAS-AP4: 8.8 CM2
LEFT ATRIUM SIZE: 3.6 CM
LEFT INTERNAL DIMENSION IN SYSTOLE: 2.7 CM (ref 2.1–4)
LEFT VENTRICULAR INTERNAL DIMENSION IN DIASTOLE: 3.9 CM (ref 3.5–6)
LEFT VENTRICULAR POSTERIOR WALL IN END DIASTOLE: 1.3 CM
LEFT VENTRICULAR STROKE VOLUME: 39 ML
LVSV (TEICH): 39 ML
MV E'TISSUE VEL-SEP: 8 CM/S
MV PEAK A VEL: 1.08 M/S
MV PEAK E VEL: 76 CM/S
RIGHT ATRIUM AREA SYSTOLE A4C: 9.5 CM2
RIGHT VENTRICLE ID DIMENSION: 2.3 CM
SL CV AV DECELERATION TIME RETROGRADE: 2784 MS
SL CV AV PEAK GRADIENT RETROGRADE: 89 MMHG
SL CV LEFT ATRIUM LENGTH A2C: 5.1 CM
SL CV PED ECHO LEFT VENTRICLE DIASTOLIC VOLUME (MOD BIPLANE) 2D: 66 ML
SL CV PED ECHO LEFT VENTRICLE SYSTOLIC VOLUME (MOD BIPLANE) 2D: 27 ML
TRICUSPID ANNULAR PLANE SYSTOLIC EXCURSION: 2.3 CM

## 2023-06-03 PROCEDURE — 93306 TTE W/DOPPLER COMPLETE: CPT

## 2023-06-08 LAB
APICAL FOUR CHAMBER EJECTION FRACTION: 45 %
AV REGURGITATION PRESSURE HALF TIME: 807 MS
E WAVE DECELERATION TIME: 179 MS
FRACTIONAL SHORTENING: 31 (ref 28–44)
INTERVENTRICULAR SEPTUM IN DIASTOLE (PARASTERNAL SHORT AXIS VIEW): 1.4 CM
INTERVENTRICULAR SEPTUM: 1.4 CM (ref 0.6–1.1)
LAAS-AP2: 14.8 CM2
LAAS-AP4: 8.8 CM2
LEFT ATRIUM SIZE: 3.6 CM
LEFT INTERNAL DIMENSION IN SYSTOLE: 2.7 CM (ref 2.1–4)
LEFT VENTRICULAR INTERNAL DIMENSION IN DIASTOLE: 3.9 CM (ref 3.5–6)
LEFT VENTRICULAR POSTERIOR WALL IN END DIASTOLE: 1.3 CM
LEFT VENTRICULAR STROKE VOLUME: 39 ML
LVSV (TEICH): 39 ML
MV E'TISSUE VEL-SEP: 8 CM/S
MV PEAK A VEL: 1.08 M/S
MV PEAK E VEL: 76 CM/S
RIGHT ATRIUM AREA SYSTOLE A4C: 9.5 CM2
SL CV AV DECELERATION TIME RETROGRADE: 2784 MS
SL CV AV PEAK GRADIENT RETROGRADE: 89 MMHG
SL CV LEFT ATRIUM LENGTH A2C: 5.1 CM
SL CV LV EF: 65
SL CV PED ECHO LEFT VENTRICLE DIASTOLIC VOLUME (MOD BIPLANE) 2D: 66 ML
SL CV PED ECHO LEFT VENTRICLE SYSTOLIC VOLUME (MOD BIPLANE) 2D: 27 ML
TRICUSPID ANNULAR PLANE SYSTOLIC EXCURSION: 2.3 CM

## 2023-06-08 PROCEDURE — 93306 TTE W/DOPPLER COMPLETE: CPT | Performed by: INTERNAL MEDICINE

## 2023-06-19 ENCOUNTER — HOSPITAL ENCOUNTER (OUTPATIENT)
Dept: CT IMAGING | Facility: HOSPITAL | Age: 48
Discharge: HOME | End: 2023-06-19
Payer: COMMERCIAL

## 2023-06-19 DIAGNOSIS — I77.89 OTHER SPECIFIED DISORDERS OF ARTERIES AND ARTERIOLES (HCC): ICD-10-CM

## 2023-06-19 DIAGNOSIS — I71.22 ANEURYSM OF THE AORTIC ARCH, WITHOUT RUPTURE (HCC): ICD-10-CM

## 2023-06-19 PROCEDURE — 71275 CT ANGIOGRAPHY CHEST: CPT

## 2023-06-19 RX ADMIN — IOHEXOL 85 ML: 350 INJECTION, SOLUTION INTRAVENOUS at 13:36

## 2023-06-29 ENCOUNTER — OFFICE VISIT (OUTPATIENT)
Dept: OBGYN CLINIC | Facility: CLINIC | Age: 48
End: 2023-06-29
Payer: COMMERCIAL

## 2023-06-29 VITALS
DIASTOLIC BLOOD PRESSURE: 82 MMHG | SYSTOLIC BLOOD PRESSURE: 134 MMHG | HEIGHT: 64 IN | HEART RATE: 91 BPM | BODY MASS INDEX: 31.92 KG/M2 | WEIGHT: 187 LBS | TEMPERATURE: 97.6 F

## 2023-06-29 DIAGNOSIS — M25.511 CHRONIC RIGHT SHOULDER PAIN: ICD-10-CM

## 2023-06-29 DIAGNOSIS — M75.01 ADHESIVE CAPSULITIS OF RIGHT SHOULDER: Primary | ICD-10-CM

## 2023-06-29 DIAGNOSIS — G89.29 CHRONIC RIGHT SHOULDER PAIN: ICD-10-CM

## 2023-06-29 PROCEDURE — 99213 OFFICE O/P EST LOW 20 MIN: CPT | Performed by: ORTHOPAEDIC SURGERY

## 2023-06-29 NOTE — PROGRESS NOTES
ASSESSMENT/PLAN:    Diagnoses and all orders for this visit:    Adhesive capsulitis of right shoulder    Chronic right shoulder pain    Other orders  -     metoprolol tartrate (LOPRESSOR) 25 mg tablet; Take 25 mg by mouth in the morning         Plan:   Reviewed today's physical exam findings and x-ray findings with patient at time of visit  Discussed with patient, and her mother, that today's physical exam is consistent with persistent adhesive capsulitis  Risks and benefits of conservative and operative treatments were discussed in detail with the patient  The patient elected to proceed with conservative management at this time  Discussed possible manipulation if stiffness persists  Continue with formal physical therapy / home exercise program to improve range of motion and strengthening of right shoulder  NSAIDs / tylenol as needed for pain and soreness  Should her symptoms worsen or fail to improve, the patient may schedule follow-up with  Patient, and her mother, express understanding and is in agreement with this treatment plan   _____________________________________________________  CHIEF COMPLAINT:  Chief Complaint   Patient presents with   • Follow-up     SUBJECTIVE:  Manda Chadwick is a 57y o  year old female who presents for follow up of adhesive capsulitis of the right shoulder  The patient was last seen on 05/18/2023 in which she was instructed to continue formal outpatient physical therapy to regain range of motion of right shoulder  The patient received a cortisone injection to her right subacromial bursa on 04/28/2023 to provide symptomatic relief  On today's presentation, the patient notes significant improvement in her symptoms  The patient states she has been compliant with formal outpatient physical therapy and has seen improvement in overall range of motion  The patient has been taking Advil as needed to provide symptomatic relief   She denies any new onset bruising, swelling, numbness, or tingling  PAST MEDICAL HISTORY:  Past Medical History:   Diagnosis Date   • Anxiety    • Depression    • Hypothyroid    • Mental retardation        PAST SURGICAL HISTORY:  Past Surgical History:   Procedure Laterality Date   • HERNIA REPAIR     • NO PAST SURGERIES         FAMILY HISTORY:  Family History   Problem Relation Age of Onset   • Stroke Father    • Hypertension Maternal Grandmother    • Hypertension Maternal Grandfather        SOCIAL HISTORY:  Social History     Tobacco Use   • Smoking status: Never   • Smokeless tobacco: Never   Vaping Use   • Vaping Use: Never used   Substance Use Topics   • Alcohol use: Never   • Drug use: Never       MEDICATIONS:    Current Outpatient Medications:   •  DULoxetine (CYMBALTA) 60 mg delayed release capsule, Take 60 mg by mouth daily, Disp: , Rfl:   •  ibuprofen (MOTRIN) 200 mg tablet, Take by mouth every 6 (six) hours as needed for mild pain, Disp: , Rfl:   •  JUNEL FE 1 5/30 1 5-30 MG-MCG tablet, Take 1 tablet by mouth daily, Disp: , Rfl: 1  •  levothyroxine 100 mcg tablet, Take 1 tablet (100 mcg total) by mouth daily, Disp: 90 tablet, Rfl: 3  •  metoprolol tartrate (LOPRESSOR) 25 mg tablet, Take 25 mg by mouth in the morning, Disp: , Rfl:   •  naproxen (NAPROSYN) 500 mg tablet, Take 1 tablet (500 mg total) by mouth 2 (two) times a day with meals, Disp: 60 tablet, Rfl: 1  •  OLANZapine (ZyPREXA) 2 5 mg tablet, Take 2 5 mg by mouth daily at bedtime, Disp: , Rfl:   •  Topiramate (TOPAMAX PO), Take 50 mg by mouth 2 (two) times a day , Disp: , Rfl:   •  FLUoxetine (PROzac) 20 MG tablet, Take 20 mg by mouth daily (Patient not taking: Reported on 3/31/2023), Disp: , Rfl:     ALLERGIES:  No Known Allergies    REVIEW OF SYSTEMS:  Pertinent items are noted in HPI    A comprehensive review of systems was negative   _____________________________________________________  PHYSICAL EXAMINATION:  General: well developed and well nourished, alert, oriented times 3 and appears comfortable  Psychiatric: Normal  HEENT: Benign  Cardiovascular: Normal rate  Pulmonary: No wheezing or stridor  Skin: No masses, erythema, lacerations, fluctation, ulcerations  Neurovascular: Palpable distal pulses    MUSCULOSKELETAL EXAMINATION:  Right shoulder -   No anatomical deformity  Skin is warm and dry to touch with no signs of erythema, ecchymosis, or infection  No soft tissue swelling or effusion noted  No palpable crepitus with passive motion  Minimally TTP over anterior shoulder, minimally TTP over lateral deltoid/subacromial bursa, mildly TTP over posterior capsule  ROM FF 0° - 90° with capsular end feel, ABD 0° - 80° with capsular end feel, ER 0° - 30° with capsular end    MMT 4+/5 throughout  - glenohumeral instability appreciated on exam  - Neer's, - Latif  - Speed's, - Yergason's  - empty can, - drop-arm, - belly press, - resisted external rotation, - lift-off  Demonstrates normal elbow, wrist, and finger motion  2+ distal radial pulse with brisk capillary refill to the fingers  Radial, median, and ulnar motor and sensory distributions intact  Sensation to light touch intact distally    Scribe Attestation    I,:  Karissa Jean-Baptiste am acting as a scribe while in the presence of the attending physician :       I,:  Kimmie Vu personally performed the services described in this documentation    as scribed in my presence :

## 2023-08-15 ENCOUNTER — TELEPHONE (OUTPATIENT)
Dept: ADMINISTRATIVE | Facility: OTHER | Age: 48
End: 2023-08-15

## 2023-08-15 ENCOUNTER — OFFICE VISIT (OUTPATIENT)
Dept: FAMILY MEDICINE CLINIC | Facility: CLINIC | Age: 48
End: 2023-08-15
Payer: COMMERCIAL

## 2023-08-15 VITALS
WEIGHT: 180.6 LBS | DIASTOLIC BLOOD PRESSURE: 79 MMHG | OXYGEN SATURATION: 99 % | HEART RATE: 95 BPM | SYSTOLIC BLOOD PRESSURE: 114 MMHG | BODY MASS INDEX: 31 KG/M2

## 2023-08-15 DIAGNOSIS — M75.01 ADHESIVE CAPSULITIS OF RIGHT SHOULDER: ICD-10-CM

## 2023-08-15 DIAGNOSIS — Z12.31 ENCOUNTER FOR SCREENING MAMMOGRAM FOR BREAST CANCER: ICD-10-CM

## 2023-08-15 DIAGNOSIS — F41.1 GENERALIZED ANXIETY DISORDER: ICD-10-CM

## 2023-08-15 DIAGNOSIS — H90.0 CONDUCTIVE HEARING LOSS, BILATERAL: ICD-10-CM

## 2023-08-15 DIAGNOSIS — Z12.4 SCREENING FOR CERVICAL CANCER: ICD-10-CM

## 2023-08-15 DIAGNOSIS — E03.9 HYPOTHYROIDISM, UNSPECIFIED TYPE: Primary | ICD-10-CM

## 2023-08-15 DIAGNOSIS — E87.20 METABOLIC ACIDOSIS: ICD-10-CM

## 2023-08-15 DIAGNOSIS — E55.9 VITAMIN D DEFICIENCY: ICD-10-CM

## 2023-08-15 DIAGNOSIS — G40.909 SEIZURE DISORDER (HCC): ICD-10-CM

## 2023-08-15 DIAGNOSIS — E66.09 CLASS 1 OBESITY DUE TO EXCESS CALORIES WITHOUT SERIOUS COMORBIDITY WITH BODY MASS INDEX (BMI) OF 33.0 TO 33.9 IN ADULT: ICD-10-CM

## 2023-08-15 DIAGNOSIS — I77.810 ASCENDING AORTA DILATATION (HCC): ICD-10-CM

## 2023-08-15 PROBLEM — F41.9 ANXIETY: Status: ACTIVE | Noted: 2018-11-23

## 2023-08-15 PROBLEM — R05.9 COUGH: Status: RESOLVED | Noted: 2018-02-03 | Resolved: 2023-08-15

## 2023-08-15 PROCEDURE — 99204 OFFICE O/P NEW MOD 45 MIN: CPT | Performed by: INTERNAL MEDICINE

## 2023-08-15 PROCEDURE — 99396 PREV VISIT EST AGE 40-64: CPT | Performed by: INTERNAL MEDICINE

## 2023-08-15 RX ORDER — CHOLECALCIFEROL (VITAMIN D3) 1250 MCG
50000 CAPSULE ORAL WEEKLY
COMMUNITY
Start: 2023-08-04

## 2023-08-15 NOTE — ASSESSMENT & PLAN NOTE
She hasn't had a seizure for decades but her EEG did show a seizure focus. She is on topiramate and follows with neurology. She did have a bicarbonate of 20 the last 2 times her blood work was checked and were going to repeat her basic metabolic panel.

## 2023-08-15 NOTE — TELEPHONE ENCOUNTER
----- Message from Marily Ugarte sent at 8/15/2023 10:28 AM EDT -----  Regarding: Care Gap Update  08/15/23 10:28 AM    Hello, our patient attached above has had Mammogram completed/performed. Please assist in updating the patient chart by pulling the Care Everywhere (CE) document. The date of service is 09/01/2022 at [facility].      Thank you,  Marily Ugarte  [facility] [address] [facility]

## 2023-08-15 NOTE — TELEPHONE ENCOUNTER
Upon review of the In Basket request we were able to locate, review, and update the patient chart as requested for Mammogram.    Any additional questions or concerns should be emailed to the Practice Liaisons via the appropriate education email address, please do not reply via In Basket. Thank you  Chip Loza         08/15/23 1:29 PM     [facility] CareGap SmartForm used to document caregap status.     Chip Loza

## 2023-08-15 NOTE — PROGRESS NOTES
Assessment/Plan:    Problem List Items Addressed This Visit        Endocrine    Hypothyroidism - Primary     She is on levothyroxine and her most recent TSH was normal.  Continue current regimen. Cardiovascular and Mediastinum    Ascending aorta dilatation (HCC)     Her aortic root was 4.6 cm on a recent echocardiogram.  Continue follow-up with cardiology. Nervous and Auditory    Seizure disorder [facility])     She hasn't had a seizure for decades but her EEG did show a seizure focus. She is on topiramate and follows with neurology. She did have a bicarbonate of 20 the last 2 times her blood work was checked and were going to repeat her basic metabolic panel. Conductive hearing loss, bilateral     Has bilateral hearing aids. Follows with Dr. Tank Jacobsen. Musculoskeletal and Integument    Adhesive capsulitis of right shoulder     She is following with orthopedics for this. Other    Class 1 obesity due to excess calories without serious comorbidity with body mass index (BMI) of 33.0 to 33.9 in adult     Encouraged her to increase her intake of fruits and vegetables. Generalized anxiety disorder     This is being managed by psychiatry. Other Visit Diagnoses     Screening for cervical cancer        Encounter for screening mammogram for breast cancer        Vitamin D deficiency        Relevant Orders    Vitamin D 25 hydroxy    Metabolic acidosis        Relevant Orders    Basic metabolic panel        BMI Counseling: Body mass index is 31 kg/m². The BMI is above normal. Nutrition recommendations include encouraging healthy choices of fruits and vegetables. Rationale for BMI follow-up plan is due to patient being overweight or obese. Depression Screening and Follow-up Plan: Patient was screened for depression during today's encounter. They screened negative with a PHQ-2 score of 2. A Cologuard was ordered by her previous primary care physician last month. Await those results. He does get mammograms. She follows with GYN. Jamel Sires going to be testing her for perimenopausal state. She follows with an eye doctor as well as her dentist.    Chief Complaint    Physical Exam; Care Gap Request; Heart Problem         Patient ID: Jose Luis Devi is a 57 y.o. female who returns for a preventive visit. No new issues to discuss. She was recently referred to cardiology for evaluation of tachycardia. She has a history of seizures that started at age 17 after being under anesthesia for a dental procedure. She hasn't had a seizure since 1985. She follows with Dr. Dary Lacy. Objective:    /79 (BP Location: Left arm, Patient Position: Sitting, Cuff Size: Large)   Pulse 95   Wt 81.9 kg (180 lb 9.6 oz)   SpO2 99%   BMI 31.00 kg/m²     Wt Readings from Last 3 Encounters:   08/15/23 81.9 kg (180 lb 9.6 oz)   06/29/23 84.8 kg (187 lb)   06/03/23 84.8 kg (187 lb)         Physical Exam  Vitals reviewed. Constitutional:       General: She is not in acute distress. Appearance: Normal appearance. She is well-developed. She is not ill-appearing. HENT:      Head: Normocephalic and atraumatic. Right Ear: Tympanic membrane and external ear normal.      Left Ear: Tympanic membrane and external ear normal.      Nose: Nose normal.      Mouth/Throat:      Mouth: Mucous membranes are moist.      Pharynx: Oropharynx is clear. Eyes:      General: No scleral icterus. Extraocular Movements: Extraocular movements intact. Pupils: Pupils are equal, round, and reactive to light. Neck:      Thyroid: No thyroid mass or thyromegaly. Vascular: No JVD. Cardiovascular:      Rate and Rhythm: Normal rate and regular rhythm. Heart sounds: Normal heart sounds. No murmur heard. No friction rub. No gallop. Pulmonary:      Breath sounds: Normal breath sounds. Abdominal:      General: Bowel sounds are normal. There is no distension.       Palpations: Abdomen is soft. There is no mass. Musculoskeletal:         General: No swelling. Comments: She can only abduct the right shoulder to about 80 degrees. Neurological:      Mental Status: She is alert. Comments: Cranial nerves II-XII are intact, motor was 5/5 in all major groups.    Psychiatric:         Mood and Affect: Mood normal.

## 2023-09-06 ENCOUNTER — TELEPHONE (OUTPATIENT)
Dept: ADMINISTRATIVE | Facility: OTHER | Age: 48
End: 2023-09-06

## 2023-09-06 NOTE — TELEPHONE ENCOUNTER
Upon review of the In Basket request we were able to locate, review, and update the patient chart as requested for Mammogram.    Any additional questions or concerns should be emailed to the Practice Liaisons via the appropriate education email address, please do not reply via In Basket.     Thank you  Brie Meza MA

## 2023-09-06 NOTE — TELEPHONE ENCOUNTER
----- Message from Rafa Coon, [employer] sent at 9/6/2023  7:36 AM EDT -----  Regarding: Care Gap request  09/06/23 7:36 AM    Hello, our patient attached above has had Mammogram completed/performed. Please assist in updating the patient chart by pulling the Care Everywhere (CE) document. The date of service is 2023.      Thank you,  Rafa QUINN [facility] AT [facility]

## 2024-01-19 ENCOUNTER — TELEPHONE (OUTPATIENT)
Dept: ADMINISTRATIVE | Facility: OTHER | Age: 49
End: 2024-01-19

## 2024-01-19 NOTE — TELEPHONE ENCOUNTER
----- Message from Dedra Cardoso sent at 1/18/2024  3:42 PM EST -----  Regarding: care gap request  01/18/24 3:42 PM    Hello, our patient No patient name on file. has had CRC: Cologuard completed/performed. Please assist in updating the patient chart by pulling the Care Everywhere (CE) document. The date of service is 20235.     Thank you,  Dedra Cardoso  [facility] PRIMARY [facility]

## 2024-01-19 NOTE — TELEPHONE ENCOUNTER
.IT is working on correcting the Cologuard interface. The requested item will be linked by the system at that time. Request is being closed. Thank you

## 2024-01-19 NOTE — TELEPHONE ENCOUNTER
----- Message from Dedra Cardoso sent at 1/18/2024  1:23 PM EST -----  Regarding: cologaurd results  Patient had negative cologaurd completed 8/21/23 through [facility].

## 2024-02-06 ENCOUNTER — APPOINTMENT (OUTPATIENT)
Dept: LAB | Facility: HOSPITAL | Age: 49
End: 2024-02-06
Payer: COMMERCIAL

## 2024-02-06 DIAGNOSIS — E03.9 HYPOTHYROIDISM, UNSPECIFIED TYPE: ICD-10-CM

## 2024-02-06 DIAGNOSIS — E87.20 METABOLIC ACIDOSIS: ICD-10-CM

## 2024-02-06 DIAGNOSIS — E55.9 VITAMIN D DEFICIENCY: ICD-10-CM

## 2024-02-08 ENCOUNTER — APPOINTMENT (OUTPATIENT)
Dept: LAB | Facility: HOSPITAL | Age: 49
End: 2024-02-08
Payer: COMMERCIAL

## 2024-02-08 DIAGNOSIS — E87.20 METABOLIC ACIDOSIS: Primary | ICD-10-CM

## 2024-02-08 DIAGNOSIS — E55.9 VITAMIN D DEFICIENCY: ICD-10-CM

## 2024-02-08 PROCEDURE — 36415 COLL VENOUS BLD VENIPUNCTURE: CPT

## 2024-02-23 ENCOUNTER — OFFICE VISIT (OUTPATIENT)
Dept: FAMILY MEDICINE CLINIC | Facility: CLINIC | Age: 49
End: 2024-02-23
Payer: COMMERCIAL

## 2024-02-23 VITALS
HEART RATE: 83 BPM | WEIGHT: 182 LBS | HEIGHT: 64 IN | OXYGEN SATURATION: 98 % | DIASTOLIC BLOOD PRESSURE: 74 MMHG | SYSTOLIC BLOOD PRESSURE: 122 MMHG | BODY MASS INDEX: 31.07 KG/M2

## 2024-02-23 DIAGNOSIS — F41.1 GENERALIZED ANXIETY DISORDER: ICD-10-CM

## 2024-02-23 DIAGNOSIS — G40.909 SEIZURE DISORDER (HCC): ICD-10-CM

## 2024-02-23 DIAGNOSIS — E03.9 HYPOTHYROIDISM, UNSPECIFIED TYPE: Primary | ICD-10-CM

## 2024-02-23 PROBLEM — E55.9 VITAMIN D DEFICIENCY: Status: ACTIVE | Noted: 2024-02-23

## 2024-02-23 PROCEDURE — 99214 OFFICE O/P EST MOD 30 MIN: CPT | Performed by: FAMILY MEDICINE

## 2024-02-23 NOTE — PROGRESS NOTES
"Assessment/Plan:       1. Hypothyroidism, unspecified type  Assessment & Plan:  Continue meds, no changes      2. Seizure disorder (HCC)  Assessment & Plan:  Continue meds, no changes      3. Generalized anxiety disorder  Assessment & Plan:  Continue meds, no changes            Subjective:      Patient ID: Elizabeth Bonilla is a 48 y.o. female.    Patient is here to follow-up on hypothyroidism, seizure disorder, anxiety and vitamin D deficiency.  She is stable and doing well.  The Zyprexa has really helped her interpersonal relationships and communication with her mother it is left somewhat of a flat affect and facial expressions are lacking.  They will discuss this with psychiatry.  She was unable to get blood work they were unable to draw it even under ultrasound there manage try again when she goes to her endocrinology appointment.  No specific new complaints or concerns today she is feeling well and doing well in general.        The following portions of the patient's history were reviewed and updated as appropriate: allergies, current medications, past family history, past medical history, past social history, past surgical history, and problem list.    Review of Systems   Respiratory: Negative.     Cardiovascular: Negative.    Gastrointestinal: Negative.          Objective:      /74 (BP Location: Right arm, Patient Position: Sitting, Cuff Size: Standard)   Pulse 83   Ht 5' 4\" (1.626 m)   Wt 82.6 kg (182 lb)   SpO2 98%   BMI 31.24 kg/m²          Physical Exam  Vitals and nursing note reviewed.   Constitutional:       Appearance: Normal appearance.   HENT:      Head: Normocephalic and atraumatic.      Nose: Nose normal.      Mouth/Throat:      Mouth: Mucous membranes are moist.   Eyes:      Extraocular Movements: Extraocular movements intact.      Pupils: Pupils are equal, round, and reactive to light.   Cardiovascular:      Rate and Rhythm: Normal rate and regular rhythm.      Pulses: Normal pulses. "   Pulmonary:      Effort: Pulmonary effort is normal.      Breath sounds: Normal breath sounds.   Abdominal:      General: Bowel sounds are normal.      Palpations: Abdomen is soft.   Musculoskeletal:      Cervical back: Normal range of motion.   Skin:     General: Skin is warm and dry.      Capillary Refill: Capillary refill takes less than 2 seconds.   Neurological:      General: No focal deficit present.      Mental Status: She is alert.   Psychiatric:         Mood and Affect: Mood normal.

## 2024-04-04 ENCOUNTER — APPOINTMENT (OUTPATIENT)
Dept: LAB | Facility: CLINIC | Age: 49
End: 2024-04-04
Payer: COMMERCIAL

## 2024-04-04 ENCOUNTER — OFFICE VISIT (OUTPATIENT)
Dept: ENDOCRINOLOGY | Facility: CLINIC | Age: 49
End: 2024-04-04
Payer: COMMERCIAL

## 2024-04-04 VITALS
HEART RATE: 95 BPM | DIASTOLIC BLOOD PRESSURE: 58 MMHG | WEIGHT: 178.8 LBS | SYSTOLIC BLOOD PRESSURE: 120 MMHG | BODY MASS INDEX: 30.52 KG/M2 | HEIGHT: 64 IN

## 2024-04-04 DIAGNOSIS — E03.9 HYPOTHYROIDISM, UNSPECIFIED TYPE: Primary | ICD-10-CM

## 2024-04-04 DIAGNOSIS — E55.9 VITAMIN D DEFICIENCY: ICD-10-CM

## 2024-04-04 DIAGNOSIS — E87.20 METABOLIC ACIDOSIS: ICD-10-CM

## 2024-04-04 LAB
25(OH)D3 SERPL-MCNC: >120 NG/ML (ref 30–100)
ANION GAP SERPL CALCULATED.3IONS-SCNC: 11 MMOL/L (ref 4–13)
BUN SERPL-MCNC: 12 MG/DL (ref 5–25)
CALCIUM SERPL-MCNC: 8.8 MG/DL (ref 8.4–10.2)
CHLORIDE SERPL-SCNC: 109 MMOL/L (ref 96–108)
CO2 SERPL-SCNC: 17 MMOL/L (ref 21–32)
CREAT SERPL-MCNC: 0.78 MG/DL (ref 0.6–1.3)
GFR SERPL CREATININE-BSD FRML MDRD: 91 ML/MIN/1.73SQ M
GLUCOSE P FAST SERPL-MCNC: 78 MG/DL (ref 65–99)
POTASSIUM SERPL-SCNC: 3.8 MMOL/L (ref 3.5–5.3)
SODIUM SERPL-SCNC: 137 MMOL/L (ref 135–147)
T4 FREE SERPL-MCNC: 0.9 NG/DL (ref 0.61–1.12)
TSH SERPL DL<=0.05 MIU/L-ACNC: 2.16 UIU/ML (ref 0.45–4.5)

## 2024-04-04 PROCEDURE — 36415 COLL VENOUS BLD VENIPUNCTURE: CPT

## 2024-04-04 PROCEDURE — 82306 VITAMIN D 25 HYDROXY: CPT

## 2024-04-04 PROCEDURE — 99213 OFFICE O/P EST LOW 20 MIN: CPT | Performed by: PHYSICIAN ASSISTANT

## 2024-04-04 PROCEDURE — 84443 ASSAY THYROID STIM HORMONE: CPT

## 2024-04-04 PROCEDURE — 80048 BASIC METABOLIC PNL TOTAL CA: CPT

## 2024-04-04 PROCEDURE — 84439 ASSAY OF FREE THYROXINE: CPT

## 2024-04-04 NOTE — ASSESSMENT & PLAN NOTE
Other than fatigue, heat intolerance/sweating, no overly concerning symptoms from a thyroid perspective.  No compressive neck symptoms.   No recent thyroid function labs, assisted with collection today at neighboring lab site.   Continue levothyroxine 100mcg daily for now, with potential adjustments to follow based on TFTs.  RTC with labs - 12 months or sooner should concerning symptoms develop, concerning biochemical findings.

## 2024-04-04 NOTE — PROGRESS NOTES
Elizabeth Bonilla 48 y.o. female MRN: [id number]    Encounter: [id number]      Assessment/Plan   Problem List Items Addressed This Visit          Endocrine    Hypothyroidism - Primary     Other than fatigue, heat intolerance/sweating, no overly concerning symptoms from a thyroid perspective.  No compressive neck symptoms.   No recent thyroid function labs, assisted with collection today at neighboring lab site.   Continue levothyroxine 100mcg daily for now, with potential adjustments to follow based on TFTs.  RTC with labs - 12 months or sooner should concerning symptoms develop, concerning biochemical findings.              CC:   Hypothyroidism    History of Present Illness     HPI:  Elizabeth is a 48-year-old female with hypothyroidism who presents for routine follow-up visit today.  Last endocrinology visit: 3/31/2023.  At that time, hypothyroidism was stable.  Elizabeth is accompanied by her friend and coworker, Adelina, who is helpful with providing additional details with history.    Elizabeth feels well overall.  No significant changes to her medical history over the past year.  She is been taking her levothyroxine 100 mcg daily on empty stomach first thing in the morning  from other medications or food by at least 30 minutes.  No missed, skipped, forgotten doses.  She has been maintained on this current levothyroxine dose for the past year.    She does admit to ongoing fatigue.  She also experiences heat intolerance, sweating intermittently.  Otherwise no overly concerning symptoms suggesting hyper or hypothyroidism.    She does not take any biotin supplements.  There is no family history of thyroid dysfunction.  No prior history of thyroid nodules, thyroid surgery.    Elizabeth has a history of difficult access veins, complicating blood draws. Recent labs collected 3/13/24 do not include thyroid function labs for this reason.       Review of Systems   Constitutional:  Negative for chills and fever.   HENT:  Negative  for ear pain and sore throat.    Eyes:  Negative for pain and visual disturbance.   Respiratory:  Negative for cough and shortness of breath.    Cardiovascular:  Negative for chest pain and palpitations.   Gastrointestinal:  Negative for abdominal pain and vomiting.   Endocrine: Negative for polydipsia and polyuria.   Genitourinary:  Negative for dysuria and hematuria.   Musculoskeletal:  Negative for arthralgias and back pain.   Skin:  Negative for color change and rash.   Neurological:  Negative for seizures and syncope.   All other systems reviewed and are negative.      Historical Information   Past Medical History:   Diagnosis Date    Anxiety     Conductive hearing loss, bilateral 8/15/2023    Depression     Hypothyroid     Mental retardation      Past Surgical History:   Procedure Laterality Date    HERNIA REPAIR  1977    inguinal and umbilical    WISDOM TOOTH EXTRACTION       Social History   Social History     Substance and Sexual Activity   Alcohol Use Never     Social History     Substance and Sexual Activity   Drug Use Never     Social History     Tobacco Use   Smoking Status Never   Smokeless Tobacco Never     Family History:   Family History   Problem Relation Age of Onset    Breast cancer Mother     Arthritis Mother     Stroke Father     Hypertension Maternal Grandmother     Hypertension Maternal Grandfather     Hypertension Paternal Grandfather        Meds/Allergies   Current Outpatient Medications   Medication Sig Dispense Refill    DULoxetine (CYMBALTA) 60 mg delayed release capsule Take 60 mg by mouth daily      JUNEL FE 1.5/30 1.5-30 MG-MCG tablet Take 1 tablet by mouth daily  1    levothyroxine 100 mcg tablet Take 1 tablet (100 mcg total) by mouth daily 90 tablet 3    metoprolol tartrate (LOPRESSOR) 25 mg tablet Take 25 mg by mouth in the morning      OLANZapine (ZyPREXA) 2.5 mg tablet Take 2.5 mg by mouth daily at bedtime      Topiramate (TOPAMAX PO) Take 50 mg by mouth 2 (two) times a day     "    No current facility-administered medications for this visit.     No Known Allergies    Objective   Vitals: Blood pressure 120/58, pulse 95, height 5' 4\" (1.626 m), weight 81.1 kg (178 lb 12.8 oz).    Physical Exam  Constitutional:       General: She is not in acute distress.     Appearance: She is obese. She is not ill-appearing.   Neck:      Thyroid: No thyroid mass, thyromegaly or thyroid tenderness.   Cardiovascular:      Rate and Rhythm: Normal rate.   Pulmonary:      Effort: Pulmonary effort is normal. No respiratory distress.      Breath sounds: Normal breath sounds.   Musculoskeletal:      Right lower leg: No edema.      Left lower leg: No edema.   Skin:     General: Skin is warm and dry.   Neurological:      Mental Status: She is alert.   Psychiatric:         Mood and Affect: Mood normal.         The history was obtained from the review of the chart, patient.    Lab Results:      Most recent thyroid function labs are from 3/14/2023.  TSH was 1.603, T4 was 1.36    Imaging Studies:   No relevant imaging studies.    Portions of the record may have been created with voice recognition software. Occasional wrong word or \"sound a like\" substitutions may have occurred due to the inherent limitations of voice recognition software. Read the chart carefully and recognize, using context, where substitutions have occurred.    "

## 2024-04-05 ENCOUNTER — TELEPHONE (OUTPATIENT)
Dept: ENDOCRINOLOGY | Facility: CLINIC | Age: 49
End: 2024-04-05

## 2024-04-05 DIAGNOSIS — E03.9 HYPOTHYROIDISM, UNSPECIFIED TYPE: Primary | ICD-10-CM

## 2024-04-05 DIAGNOSIS — E87.20 METABOLIC ACIDOSIS: Primary | ICD-10-CM

## 2024-05-14 ENCOUNTER — HOSPITAL ENCOUNTER (EMERGENCY)
Facility: HOSPITAL | Age: 49
Discharge: HOME | End: 2024-05-14
Attending: EMERGENCY MEDICINE | Admitting: EMERGENCY MEDICINE
Payer: COMMERCIAL

## 2024-05-14 VITALS
SYSTOLIC BLOOD PRESSURE: 136 MMHG | DIASTOLIC BLOOD PRESSURE: 92 MMHG | TEMPERATURE: 97 F | HEART RATE: 84 BPM | OXYGEN SATURATION: 100 % | RESPIRATION RATE: 18 BRPM

## 2024-05-14 DIAGNOSIS — T63.441A BEE STING: Primary | ICD-10-CM

## 2024-05-14 PROCEDURE — 99284 EMERGENCY DEPT VISIT MOD MDM: CPT | Performed by: PHYSICIAN ASSISTANT

## 2024-05-14 PROCEDURE — 99283 EMERGENCY DEPT VISIT LOW MDM: CPT

## 2024-05-14 RX ORDER — DIPHENHYDRAMINE HCL 25 MG
50 TABLET ORAL ONCE
Status: COMPLETED | OUTPATIENT
Start: 2024-05-14 | End: 2024-05-14

## 2024-05-14 RX ORDER — AMOXICILLIN AND CLAVULANATE POTASSIUM 875; 125 MG/1; MG/1
1 TABLET, FILM COATED ORAL EVERY 12 HOURS
Qty: 14 TABLET | Refills: 0 | Status: SHIPPED | OUTPATIENT
Start: 2024-05-14 | End: 2024-05-21

## 2024-05-14 RX ORDER — AMOXICILLIN AND CLAVULANATE POTASSIUM 875; 125 MG/1; MG/1
1 TABLET, FILM COATED ORAL ONCE
Status: COMPLETED | OUTPATIENT
Start: 2024-05-14 | End: 2024-05-14

## 2024-05-14 RX ADMIN — AMOXICILLIN AND CLAVULANATE POTASSIUM 1 TABLET: 875; 125 TABLET, FILM COATED ORAL at 16:36

## 2024-05-14 RX ADMIN — DIPHENHYDRAMINE HYDROCHLORIDE 50 MG: 25 TABLET ORAL at 16:36

## 2024-05-14 NOTE — ED PROVIDER NOTES
"History  Chief Complaint   Patient presents with    Bee Sting     Pt presents to ED after a bee sting to left first finger. States she \"swells up\" with bee stings. Airway clear and patent. Denies SOB.      Patient is a 48-year-old female MR who presents with family for the concern of bee sting.  The patient was stung on her left 1st finger by a bee.  Family at bedside states that last time this happened a few days later she had a \"delayed reaction and was sent to [place].              Prior to Admission Medications   Prescriptions Last Dose Informant Patient Reported? Taking?   DULoxetine (CYMBALTA) 60 mg delayed release capsule   Yes No   Sig: Take 60 mg by mouth daily   JUNEL FE 1.5/30 1.5-30 MG-MCG tablet   Yes No   Sig: Take 1 tablet by mouth daily   OLANZapine (ZyPREXA) 2.5 mg tablet   Yes No   Sig: Take 2.5 mg by mouth daily at bedtime   Topiramate (TOPAMAX PO)   Yes No   Sig: Take 50 mg by mouth 2 (two) times a day    levothyroxine 100 mcg tablet   No No   Sig: Take 1 tablet (100 mcg total) by mouth daily   metoprolol tartrate (LOPRESSOR) 25 mg tablet   Yes No   Sig: Take 25 mg by mouth in the morning      Facility-Administered Medications: None       Past Medical History:   Diagnosis Date    Anxiety     Conductive hearing loss, bilateral 8/15/2023    Depression     Hypothyroid     Mental retardation        Past Surgical History:   Procedure Laterality Date    HERNIA REPAIR  1977    inguinal and umbilical    WISDOM TOOTH EXTRACTION         Family History   Problem Relation Age of Onset    Breast cancer Mother     Arthritis Mother     Stroke Father     Hypertension Maternal Grandmother     Hypertension Maternal Grandfather     Hypertension Paternal Grandfather      I have reviewed and agree with the history as documented.    E-Cigarette/Vaping    E-Cigarette Use Never User      E-Cigarette/Vaping Substances     Social History     Tobacco Use    Smoking status: Never    Smokeless tobacco: Never   Vaping Use    " Vaping status: Never Used   Substance Use Topics    Alcohol use: Never    Drug use: Never       Review of Systems   All other systems reviewed and are negative.      Physical Exam  Physical Exam  Vitals and nursing note reviewed.   Constitutional:       General: She is not in acute distress.     Appearance: She is well-developed.   HENT:      Head: Normocephalic and atraumatic.      Mouth/Throat:      Mouth: No angioedema.   Eyes:      Pupils: Pupils are equal, round, and reactive to light.   Cardiovascular:      Rate and Rhythm: Normal rate and regular rhythm.      Heart sounds: No murmur heard.  Pulmonary:      Effort: Pulmonary effort is normal. No respiratory distress.      Breath sounds: Normal breath sounds.   Musculoskeletal:      Cervical back: Normal range of motion and neck supple.   Skin:     General: Skin is warm and dry.      Capillary Refill: Capillary refill takes less than 2 seconds.   Neurological:      Mental Status: She is alert. Mental status is at baseline.      Coordination: Coordination normal.   Psychiatric:         Behavior: Behavior normal.         Vital Signs  ED Triage Vitals [05/14/24 1605]   Temperature Pulse Respirations Blood Pressure SpO2   (!) 97 °F (36.1 °C) 84 18 136/92 100 %      Temp Source Heart Rate Source Patient Position - Orthostatic VS BP Location FiO2 (%)   Temporal Monitor Lying Left arm --      Pain Score       --           Vitals:    05/14/24 1605   BP: 136/92   Pulse: 84   Patient Position - Orthostatic VS: Lying         Visual Acuity      ED Medications  Medications   diphenhydrAMINE (BENADRYL) tablet 50 mg (50 mg Oral Given 5/14/24 1636)   amoxicillin-clavulanate (AUGMENTIN) 875-125 mg per tablet 1 tablet (1 tablet Oral Given 5/14/24 1636)       Diagnostic Studies  Results Reviewed       None                   No orders to display              Procedures  Procedures         ED Course                               SBIRT 20yo+      Flowsheet Row Most Recent Value  "  Initial Alcohol Screen: US AUDIT-C     1. How often do you have a drink containing alcohol? 0 Filed at: 05/14/2024 1606   2. How many drinks containing alcohol do you have on a typical day you are drinking?  0 Filed at: 05/14/2024 1606   3b. FEMALE Any Age, or MALE 65+: How often do you have 4 or more drinks on one occassion? 0 Filed at: 05/14/2024 1606   Audit-C Score 0 Filed at: 05/14/2024 1606   BALJIT: How many times in the past year have you...    Used an illegal drug or used a prescription medication for non-medical reasons? Never Filed at: 05/14/2024 1606                      Medical Decision Making  Patient is a 48-year-old female MR who presents with family for the concern of bee sting.  The patient was stung on her left 1st finger by a bee.  Family at bedside states that last time this happened a few days later she had a \"delayed reaction and was sent to [place].      Per [facility] note the patient had tenosynovitis, patient has a mercedes.  Patient guardian was educated on Benadryl use, at the time of evaluation in the past patient did not have allergic reaction the patient had an infectious etiology therefore covered with antibiotics and discharged home.      Amount and/or Complexity of Data Reviewed  Independent Historian: caregiver    Risk  OTC drugs.  Prescription drug management.             Disposition  Final diagnoses:   Bee sting     Time reflects when diagnosis was documented in both MDM as applicable and the Disposition within this note       Time User Action Codes Description Comment    5/14/2024  4:29 PM Alpesh Mendez Add [W57.XXXA] Insect bite     5/14/2024  4:30 PM Alpesh Mendez Add [T63.441A] Bee sting     5/14/2024  4:30 PM Alpesh Mendez Modify [T63.441A] Bee sting     5/14/2024  4:30 PM Alpesh Mendez Remove [W57.XXXA] Insect bite           ED Disposition       ED Disposition   Discharge    Condition   Stable    Date/Time   Tue May 14, 2024 0689    Comment   Elizabeth Bonilla discharge to home/self " care.                   Follow-up Information    None         Discharge Medication List as of 5/14/2024  4:30 PM        START taking these medications    Details   amoxicillin-clavulanate (AUGMENTIN) 875-125 mg per tablet Take 1 tablet by mouth every 12 (twelve) hours for 7 days, Starting Tue 5/14/2024, Until Tue 5/21/2024, Normal           CONTINUE these medications which have NOT CHANGED    Details   DULoxetine (CYMBALTA) 60 mg delayed release capsule Take 60 mg by mouth daily, Historical Med      JUNEL FE 1.5/30 1.5-30 MG-MCG tablet Take 1 tablet by mouth daily, Starting Mon 5/13/2019, Historical Med      levothyroxine 100 mcg tablet Take 1 tablet (100 mcg total) by mouth daily, Starting Mon 5/8/2023, Normal      metoprolol tartrate (LOPRESSOR) 25 mg tablet Take 25 mg by mouth in the morning, Historical Med      OLANZapine (ZyPREXA) 2.5 mg tablet Take 2.5 mg by mouth daily at bedtime, Historical Med      Topiramate (TOPAMAX PO) Take 50 mg by mouth 2 (two) times a day , Historical Med             No discharge procedures on file.    PDMP Review       None            ED Provider  Electronically Signed by             Alpesh Mendez PA-C  05/14/24 2713

## 2024-06-07 ENCOUNTER — HOSPITAL ENCOUNTER (OUTPATIENT)
Dept: CT IMAGING | Facility: HOSPITAL | Age: 49
End: 2024-06-07
Payer: COMMERCIAL

## 2024-06-07 DIAGNOSIS — Z87.898 PERSONAL HISTORY OF OTHER SPECIFIED CONDITIONS: ICD-10-CM

## 2024-06-07 DIAGNOSIS — R00.0 TACHYCARDIA, UNSPECIFIED: ICD-10-CM

## 2024-06-07 DIAGNOSIS — I71.21 ANEURYSM OF THE ASCENDING AORTA, WITHOUT RUPTURE (HCC): ICD-10-CM

## 2024-06-07 PROCEDURE — 71250 CT THORAX DX C-: CPT

## 2024-07-29 DIAGNOSIS — E03.9 HYPOTHYROIDISM, UNSPECIFIED TYPE: ICD-10-CM

## 2024-07-30 RX ORDER — LEVOTHYROXINE SODIUM 0.1 MG/1
100 TABLET ORAL DAILY
Qty: 90 TABLET | Refills: 1 | Status: SHIPPED | OUTPATIENT
Start: 2024-07-30

## 2024-08-30 ENCOUNTER — OFFICE VISIT (OUTPATIENT)
Dept: FAMILY MEDICINE CLINIC | Facility: CLINIC | Age: 49
End: 2024-08-30
Payer: COMMERCIAL

## 2024-08-30 ENCOUNTER — LAB (OUTPATIENT)
Dept: LAB | Facility: CLINIC | Age: 49
End: 2024-08-30
Payer: COMMERCIAL

## 2024-08-30 VITALS
DIASTOLIC BLOOD PRESSURE: 84 MMHG | HEIGHT: 64 IN | SYSTOLIC BLOOD PRESSURE: 128 MMHG | HEART RATE: 88 BPM | BODY MASS INDEX: 27.83 KG/M2 | WEIGHT: 163 LBS | OXYGEN SATURATION: 99 %

## 2024-08-30 DIAGNOSIS — R61 HYPERHIDROSIS: ICD-10-CM

## 2024-08-30 DIAGNOSIS — Z00.00 ANNUAL PHYSICAL EXAM: Primary | ICD-10-CM

## 2024-08-30 DIAGNOSIS — R61 HYPERHIDROSIS: Primary | ICD-10-CM

## 2024-08-30 LAB
ALBUMIN SERPL BCG-MCNC: 3.7 G/DL (ref 3.5–5)
ALP SERPL-CCNC: 87 U/L (ref 34–104)
ALT SERPL W P-5'-P-CCNC: 9 U/L (ref 7–52)
ANION GAP SERPL CALCULATED.3IONS-SCNC: 9 MMOL/L (ref 4–13)
AST SERPL W P-5'-P-CCNC: 12 U/L (ref 13–39)
BILIRUB SERPL-MCNC: 0.44 MG/DL (ref 0.2–1)
BUN SERPL-MCNC: 14 MG/DL (ref 5–25)
CALCIUM SERPL-MCNC: 9.3 MG/DL (ref 8.4–10.2)
CHLORIDE SERPL-SCNC: 111 MMOL/L (ref 96–108)
CO2 SERPL-SCNC: 19 MMOL/L (ref 21–32)
CREAT SERPL-MCNC: 0.83 MG/DL (ref 0.6–1.3)
GFR SERPL CREATININE-BSD FRML MDRD: 84 ML/MIN/1.73SQ M
GLUCOSE SERPL-MCNC: 85 MG/DL (ref 65–140)
POTASSIUM SERPL-SCNC: 3.7 MMOL/L (ref 3.5–5.3)
PROT SERPL-MCNC: 7.5 G/DL (ref 6.4–8.4)
SODIUM SERPL-SCNC: 139 MMOL/L (ref 135–147)
T4 FREE SERPL-MCNC: 0.89 NG/DL (ref 0.61–1.12)
TSH SERPL DL<=0.05 MIU/L-ACNC: 13.38 UIU/ML (ref 0.45–4.5)

## 2024-08-30 PROCEDURE — 36415 COLL VENOUS BLD VENIPUNCTURE: CPT

## 2024-08-30 PROCEDURE — 80053 COMPREHEN METABOLIC PANEL: CPT

## 2024-08-30 PROCEDURE — 84439 ASSAY OF FREE THYROXINE: CPT

## 2024-08-30 PROCEDURE — 99396 PREV VISIT EST AGE 40-64: CPT | Performed by: FAMILY MEDICINE

## 2024-08-30 PROCEDURE — 84443 ASSAY THYROID STIM HORMONE: CPT

## 2024-08-30 RX ORDER — METOPROLOL SUCCINATE 25 MG/1
25 TABLET, EXTENDED RELEASE ORAL DAILY
COMMUNITY
Start: 2024-06-04

## 2024-08-30 RX ORDER — ARIPIPRAZOLE 2 MG/1
2 TABLET ORAL DAILY
COMMUNITY
Start: 2024-07-17

## 2024-08-30 RX ORDER — LEVOTHYROXINE SODIUM 125 UG/1
125 TABLET ORAL DAILY
Qty: 30 TABLET | Refills: 2 | Status: SHIPPED | OUTPATIENT
Start: 2024-08-30 | End: 2024-11-28

## 2024-08-30 RX ORDER — TOPIRAMATE 100 MG/1
100 TABLET, FILM COATED ORAL
COMMUNITY
Start: 2024-08-02

## 2024-08-30 NOTE — PROGRESS NOTES
"Assessment/Plan:       1. Annual physical exam  Assessment & Plan:  Reviewed age-appropriate health maintenance and preventive care.    2. Hyperhidrosis  Comments:  check labs  Orders:  -     Comprehensive metabolic panel; Future  -     TSH, 3rd generation with Free T4 reflex; Future; Expected date: 08/30/2024        Subjective:      Patient ID: Elizabeth Bonilla is a 49 y.o. female.    She does cardiology.  Her aneurysm and everything is stable from last year.  She has excessive sweating to check some blood work for that.  She is up-to-date on all of her immunizations and screening tests and blood work.  I reviewed her med list.  We discussed the importance of trying to get routine exercise and some weight loss.  She is up-to-date on colon screening.        The following portions of the patient's history were reviewed and updated as appropriate: allergies, current medications, past family history, past medical history, past social history, past surgical history, and problem list.    Review of Systems   Respiratory: Negative.     Cardiovascular: Negative.          Objective:      /84 (BP Location: Right arm, Patient Position: Sitting, Cuff Size: Large)   Pulse 88   Ht 5' 4\" (1.626 m)   Wt 73.9 kg (163 lb)   SpO2 99%   BMI 27.98 kg/m²       Depression Screening and Follow-up Plan: Patient was screened for depression during today's encounter. They screened negative with a PHQ-2 score of 0.         Physical Exam  Vitals and nursing note reviewed.   Constitutional:       Appearance: Normal appearance.   HENT:      Head: Normocephalic and atraumatic.      Nose: Nose normal.      Mouth/Throat:      Mouth: Mucous membranes are moist.   Eyes:      Extraocular Movements: Extraocular movements intact.      Pupils: Pupils are equal, round, and reactive to light.   Cardiovascular:      Rate and Rhythm: Normal rate and regular rhythm.      Pulses: Normal pulses.   Pulmonary:      Effort: Pulmonary effort is normal.      " Breath sounds: Normal breath sounds.   Abdominal:      General: Bowel sounds are normal.      Palpations: Abdomen is soft.   Musculoskeletal:      Cervical back: Normal range of motion.   Skin:     General: Skin is warm and dry.      Capillary Refill: Capillary refill takes less than 2 seconds.   Neurological:      General: No focal deficit present.      Mental Status: She is alert.   Psychiatric:         Mood and Affect: Mood normal.

## 2024-09-03 ENCOUNTER — TELEPHONE (OUTPATIENT)
Dept: FAMILY MEDICINE CLINIC | Facility: CLINIC | Age: 49
End: 2024-09-03

## 2024-09-03 NOTE — TELEPHONE ENCOUNTER
----- Message from Robert Budinetz, MD sent at 8/30/2024  8:03 PM EDT -----  TSH is elevated.  I increased Synthroid from 100 to 125 mcg and ordered repeat nonfasting TSH in 6 weeks.

## 2024-10-17 ENCOUNTER — VBI (OUTPATIENT)
Dept: ADMINISTRATIVE | Facility: OTHER | Age: 49
End: 2024-10-17

## 2024-10-17 NOTE — TELEPHONE ENCOUNTER
10/17/24 10:21 AM     Chart reviewed for Cervical Cancer Screening was/were not submitted to the patient's insurance.     Arielle Feliz MA   [facility]

## 2024-11-22 DIAGNOSIS — R61 HYPERHIDROSIS: ICD-10-CM

## 2024-11-22 RX ORDER — LEVOTHYROXINE SODIUM 125 UG/1
125 TABLET ORAL DAILY
Qty: 30 TABLET | Refills: 0 | Status: SHIPPED | OUTPATIENT
Start: 2024-11-22

## 2024-11-24 ENCOUNTER — HOSPITAL ENCOUNTER (EMERGENCY)
Facility: HOSPITAL | Age: 49
Discharge: HOME | End: 2024-11-24
Attending: STUDENT IN AN ORGANIZED HEALTH CARE EDUCATION/TRAINING PROGRAM
Payer: COMMERCIAL

## 2024-11-24 VITALS
SYSTOLIC BLOOD PRESSURE: 157 MMHG | RESPIRATION RATE: 17 BRPM | TEMPERATURE: 96.8 F | WEIGHT: 178.57 LBS | HEART RATE: 95 BPM | OXYGEN SATURATION: 100 % | BODY MASS INDEX: 30.65 KG/M2 | DIASTOLIC BLOOD PRESSURE: 90 MMHG

## 2024-11-24 DIAGNOSIS — L01.00 IMPETIGO: Primary | ICD-10-CM

## 2024-11-24 PROCEDURE — 99282 EMERGENCY DEPT VISIT SF MDM: CPT

## 2024-11-24 PROCEDURE — 99284 EMERGENCY DEPT VISIT MOD MDM: CPT | Performed by: PHYSICIAN ASSISTANT

## 2024-11-24 RX ORDER — CEPHALEXIN 500 MG/1
500 CAPSULE ORAL EVERY 6 HOURS SCHEDULED
Qty: 28 CAPSULE | Refills: 0 | Status: SHIPPED | OUTPATIENT
Start: 2024-11-24 | End: 2024-11-24

## 2024-11-24 RX ORDER — CEPHALEXIN 500 MG/1
500 CAPSULE ORAL EVERY 6 HOURS SCHEDULED
Qty: 28 CAPSULE | Refills: 0 | Status: SHIPPED | OUTPATIENT
Start: 2024-11-24 | End: 2024-12-01

## 2024-11-24 RX ORDER — MUPIROCIN 20 MG/G
OINTMENT TOPICAL 3 TIMES DAILY
Qty: 15 G | Refills: 0 | Status: SHIPPED | OUTPATIENT
Start: 2024-11-24

## 2024-11-24 RX ORDER — MUPIROCIN 20 MG/G
OINTMENT TOPICAL 3 TIMES DAILY
Qty: 15 G | Refills: 0 | Status: SHIPPED | OUTPATIENT
Start: 2024-11-24 | End: 2024-11-24

## 2024-11-24 NOTE — ED PROVIDER NOTES
Time reflects when diagnosis was documented in both MDM as applicable and the Disposition within this note       Time User Action Codes Description Comment    11/24/2024 11:13 AM Alpesh Mendez [L01.00] Impetigo           ED Disposition       ED Disposition   Discharge    Condition   Stable    Date/Time   Sun Nov 24, 2024 11:13 AM    Comment   Elizabeth Chad discharge to home/self care.                   Assessment & Plan       Medical Decision Making  The patient is a 49-year-old female presents with friend for the concern of right facial lesion and swelling.  The patient states that she scratched her face a week ago.  States that since then she noticed redness swelling around the area.  In for evaluation.  No fevers or chills.    Obtained chair of the wound, slight swelling around the area, seems consistent with impetigo or strep infection.  Placed on Keflex and presumed cream.  Agreement with treatment plan.  Will continue outpatient prescriptions    Risk  Prescription drug management.             Medications - No data to display    ED Risk Strat Scores                           SBIRT 20yo+      Flowsheet Row Most Recent Value   Initial Alcohol Screen: US AUDIT-C     1. How often do you have a drink containing alcohol? 0 Filed at: 11/24/2024 1059   2. How many drinks containing alcohol do you have on a typical day you are drinking?  0 Filed at: 11/24/2024 1059   3b. FEMALE Any Age, or MALE 65+: How often do you have 4 or more drinks on one occassion? 0 Filed at: 11/24/2024 1059   Audit-C Score 0 Filed at: 11/24/2024 1059   BALJIT: How many times in the past year have you...    Used an illegal drug or used a prescription medication for non-medical reasons? Never Filed at: 11/24/2024 1059                            History of Present Illness       Chief Complaint   Patient presents with    Facial Swelling     Patient presents to the ED with complaints of swelling and pain to the right side of her face and lip. The  patient reports accidentally biting her lip last week, and has had pain to the area since. The area became swollen and a wound formed to the right lip area.        Past Medical History:   Diagnosis Date    Anxiety     Conductive hearing loss, bilateral 8/15/2023    Depression     Hypothyroid     Mental retardation       Past Surgical History:   Procedure Laterality Date    HERNIA REPAIR  1977    inguinal and umbilical    WISDOM TOOTH EXTRACTION        Family History   Problem Relation Age of Onset    Breast cancer Mother     Arthritis Mother     Stroke Father     Hypertension Maternal Grandmother     Hypertension Maternal Grandfather     Hypertension Paternal Grandfather       Social History     Tobacco Use    Smoking status: Never     Passive exposure: Never    Smokeless tobacco: Never   Vaping Use    Vaping status: Never Used   Substance Use Topics    Alcohol use: Never    Drug use: Never      E-Cigarette/Vaping    E-Cigarette Use Never User       E-Cigarette/Vaping Substances      I have reviewed and agree with the history as documented.     The patient is a 49-year-old female presents with friend for the concern of right facial lesion and swelling.  The patient states that she scratched her face a week ago.  States that since then she noticed redness swelling around the area.  In for evaluation.  No fevers or chills.          Review of Systems   All other systems reviewed and are negative.          Objective       ED Triage Vitals [11/24/24 1057]   Temperature Pulse Blood Pressure Respirations SpO2 Patient Position - Orthostatic VS   (!) 96.8 °F (36 °C) 102 (!) 172/97 16 100 % Lying      Temp Source Heart Rate Source BP Location FiO2 (%) Pain Score    Temporal Monitor Right arm -- 5      Vitals      Date and Time Temp Pulse SpO2 Resp BP Pain Score FACES Pain Rating User   11/24/24 1100 -- 95 100 % 17 157/90 -- -- PK   11/24/24 1057 96.8 °F (36 °C) 102 100 % 16 172/97 5 -- RR            Physical Exam  Vitals and  nursing note reviewed.   Constitutional:       General: She is not in acute distress.     Appearance: She is well-developed.   HENT:      Head: Normocephalic and atraumatic.      Mouth/Throat:      Mouth: No angioedema.   Eyes:      Pupils: Pupils are equal, round, and reactive to light.   Cardiovascular:      Rate and Rhythm: Normal rate.   Pulmonary:      Effort: Pulmonary effort is normal.   Musculoskeletal:      Cervical back: Normal range of motion and neck supple.   Skin:     General: Skin is warm and dry.      Capillary Refill: Capillary refill takes less than 2 seconds.   Neurological:      Mental Status: She is alert and oriented to person, place, and time.      Coordination: Coordination normal.   Psychiatric:         Behavior: Behavior normal.         Results Reviewed       None            No orders to display       Procedures    ED Medication and Procedure Management   Prior to Admission Medications   Prescriptions Last Dose Informant Patient Reported? Taking?   ARIPiprazole (ABILIFY) 2 mg tablet   Yes No   Sig: Take 2 mg by mouth daily   DULoxetine (CYMBALTA) 60 mg delayed release capsule   Yes No   Sig: Take 60 mg by mouth daily   JUNEL FE 1.5/30 1.5-30 MG-MCG tablet   Yes No   Sig: Take 1 tablet by mouth daily   Topiramate (TOPAMAX PO)   Yes No   Sig: Take 50 mg by mouth 2 (two) times a day    levothyroxine 125 mcg tablet   No No   Sig: TAKE 1 TABLET BY MOUTH ONCE DAILY   metoprolol succinate (TOPROL-XL) 25 mg 24 hr tablet   Yes No   Sig: Take 25 mg by mouth daily   metoprolol tartrate (LOPRESSOR) 25 mg tablet   Yes No   Sig: Take 25 mg by mouth in the morning   topiramate (TOPAMAX) 100 mg tablet   Yes No   Sig: Take 100 mg by mouth      Facility-Administered Medications: None     Discharge Medication List as of 11/24/2024 11:14 AM        CONTINUE these medications which have CHANGED    Details   cephalexin (KEFLEX) 500 mg capsule Take 1 capsule (500 mg total) by mouth every 6 (six) hours for 7 days,  Starting Sun 11/24/2024, Until Sun 12/1/2024, Normal      mupirocin (BACTROBAN) 2 % ointment Apply topically 3 (three) times a day, Starting Sun 11/24/2024, Normal           CONTINUE these medications which have NOT CHANGED    Details   ARIPiprazole (ABILIFY) 2 mg tablet Take 2 mg by mouth daily, Starting Wed 7/17/2024, Historical Med      DULoxetine (CYMBALTA) 60 mg delayed release capsule Take 60 mg by mouth daily, Historical Med      JUNEL FE 1.5/30 1.5-30 MG-MCG tablet Take 1 tablet by mouth daily, Starting Mon 5/13/2019, Historical Med      levothyroxine 125 mcg tablet TAKE 1 TABLET BY MOUTH ONCE DAILY, Starting Fri 11/22/2024, Normal      metoprolol succinate (TOPROL-XL) 25 mg 24 hr tablet Take 25 mg by mouth daily, Starting Tue 6/4/2024, Historical Med      metoprolol tartrate (LOPRESSOR) 25 mg tablet Take 25 mg by mouth in the morning, Historical Med      !! Topiramate (TOPAMAX PO) Take 50 mg by mouth 2 (two) times a day , Historical Med      !! topiramate (TOPAMAX) 100 mg tablet Take 100 mg by mouth, Starting Fri 8/2/2024, Historical Med       !! - Potential duplicate medications found. Please discuss with provider.        No discharge procedures on file.  ED SEPSIS DOCUMENTATION   Time reflects when diagnosis was documented in both MDM as applicable and the Disposition within this note       Time User Action Codes Description Comment    11/24/2024 11:13 AM Alpesh Mendez Add [L01.00] Keesha Mendez PA-C  11/24/24 1640

## 2024-12-16 ENCOUNTER — VBI (OUTPATIENT)
Dept: ADMINISTRATIVE | Facility: OTHER | Age: 49
End: 2024-12-16

## 2024-12-16 NOTE — TELEPHONE ENCOUNTER
12/16/24 9:54 AM     Chart reviewed for Cervical Cancer Screening was/were not submitted to the patient's insurance.     Arielle Feliz MA   [facility]

## 2024-12-27 DIAGNOSIS — R61 HYPERHIDROSIS: ICD-10-CM

## 2024-12-27 RX ORDER — LEVOTHYROXINE SODIUM 125 UG/1
125 TABLET ORAL DAILY
Qty: 90 TABLET | Refills: 1 | Status: SHIPPED | OUTPATIENT
Start: 2024-12-27

## 2024-12-31 ENCOUNTER — TELEPHONE (OUTPATIENT)
Age: 49
End: 2024-12-31

## 2024-12-31 DIAGNOSIS — M54.50 LOW BACK PAIN, UNSPECIFIED BACK PAIN LATERALITY, UNSPECIFIED CHRONICITY, UNSPECIFIED WHETHER SCIATICA PRESENT: Primary | ICD-10-CM

## 2024-12-31 NOTE — TELEPHONE ENCOUNTER
Patient's mom called stating that daughter is currently being seen for PT. Therapist requested that patient get a referral from provider for PT but specifically for Lower Back Pain. Patient last seen 8/30/24 for Annual physical. Patient's mom would like a call back once referral has been completed; [phone].

## 2025-02-18 ENCOUNTER — OFFICE VISIT (OUTPATIENT)
Dept: URGENT CARE | Facility: CLINIC | Age: 50
End: 2025-02-18
Payer: COMMERCIAL

## 2025-02-18 VITALS
TEMPERATURE: 96.6 F | SYSTOLIC BLOOD PRESSURE: 147 MMHG | DIASTOLIC BLOOD PRESSURE: 85 MMHG | RESPIRATION RATE: 20 BRPM | HEART RATE: 87 BPM | OXYGEN SATURATION: 97 % | HEIGHT: 64 IN | WEIGHT: 177.4 LBS | BODY MASS INDEX: 30.29 KG/M2

## 2025-02-18 DIAGNOSIS — L03.211 CELLULITIS, FACE: Primary | ICD-10-CM

## 2025-02-18 PROCEDURE — 99213 OFFICE O/P EST LOW 20 MIN: CPT

## 2025-02-18 PROCEDURE — S9088 SERVICES PROVIDED IN URGENT: HCPCS

## 2025-02-18 RX ORDER — CEPHALEXIN 500 MG/1
500 CAPSULE ORAL EVERY 6 HOURS SCHEDULED
Qty: 28 CAPSULE | Refills: 0 | Status: SHIPPED | OUTPATIENT
Start: 2025-02-18 | End: 2025-02-25

## 2025-02-18 RX ORDER — CHOLECALCIFEROL (VITAMIN D3) 1250 MCG
CAPSULE ORAL
COMMUNITY
Start: 2025-01-24

## 2025-02-18 NOTE — PATIENT INSTRUCTIONS
Keflex as prescribed  Eat yogurt with live and active cultures and/or take a probiotic at least 3 hours before or after antibiotic dose. Monitor stool for diarrhea and/or blood. If this occurs, contact primary care doctor ASAP.     Wash area with soap and water, apply topical antibiotic ointment daily  Watch for signs of new or worsening infection.  Tylenol/Motrin as needed for pain/fever    Follow up with PCP in 3-5 days.  Proceed to  ER if symptoms worsen.    If tests are performed, our office will contact you with results only if changes need to made to the care plan discussed with you at the visit. You can review your full results on [facility].

## 2025-02-18 NOTE — PROGRESS NOTES
[facility]        NAME: Elizabeth Bonilla is a 49 y.o. female  : 1975    MRN: [id number]  DATE: 2025  TIME: 2:01 PM    Assessment and Plan   Cellulitis, face [L03.211]  1. Cellulitis, face  cephalexin (KEFLEX) 500 mg capsule            Patient Instructions     Keflex as prescribed  Eat yogurt with live and active cultures and/or take a probiotic at least 3 hours before or after antibiotic dose. Monitor stool for diarrhea and/or blood. If this occurs, contact primary care doctor ASAP.     Wash area with soap and water, apply topical antibiotic ointment daily  Watch for signs of new or worsening infection.  Tylenol/Motrin as needed for pain/fever    Follow up with PCP in 3-5 days.  Proceed to  ER if symptoms worsen.    If tests are performed, our office will contact you with results only if changes need to made to the care plan discussed with you at the visit. [facility]    Chief Complaint     Chief Complaint   Patient presents with    Skin Problem     Sore on left side of lower lip starting over the weekend.          History of Present Illness       49-year-old female arrives reporting she scratched the left side of her face over the weekend (4 days ago) and has been having a difficult time with the wound healing.  Patient reports she is applying topical antibiotic ointment but feels it is not healing appropriately.  Patient denies any fevers.  Patient denies any recent cough, cold, congestion or other illness.  Patient denies any current shortness of breath, chest pain or abdominal pain.        Review of Systems   Review of Systems   Constitutional: Negative.    HENT: Negative.     Respiratory: Negative.     Cardiovascular: Negative.    Gastrointestinal: Negative.    Musculoskeletal: Negative.    Skin:  Positive for wound.         Current Medications       Current Outpatient Medications:     ARIPiprazole (ABILIFY) 2 mg tablet, Take 2 mg by  mouth daily, Disp: , Rfl:     cephalexin (KEFLEX) 500 mg capsule, Take 1 capsule (500 mg total) by mouth every 6 (six) hours for 7 days, Disp: 28 capsule, Rfl: 0    Cholecalciferol (Vitamin D3) 1.25 MG (47982 UT) CAPS, , Disp: , Rfl:     DULoxetine (CYMBALTA) 60 mg delayed release capsule, Take 60 mg by mouth daily, Disp: , Rfl:     JUNEL FE 1.5/30 1.5-30 MG-MCG tablet, Take 1 tablet by mouth daily, Disp: , Rfl: 1    levothyroxine 125 mcg tablet, TAKE 1 TABLET BY MOUTH ONCE DAILY, Disp: 90 tablet, Rfl: 1    metoprolol succinate (TOPROL-XL) 25 mg 24 hr tablet, Take 25 mg by mouth daily, Disp: , Rfl:     metoprolol tartrate (LOPRESSOR) 25 mg tablet, Take 25 mg by mouth in the morning, Disp: , Rfl:     neomycin-polymyxin-pramoxine (ANTIBIOTIC CREAM PLUS PAIN RELIEF) 1 % cream, Apply topically 2 (two) times a day, Disp: , Rfl:     Topiramate (TOPAMAX PO), Take 50 mg by mouth 2 (two) times a day , Disp: , Rfl:     mupirocin (BACTROBAN) 2 % ointment, Apply topically 3 (three) times a day (Patient not taking: Reported on 2/18/2025), Disp: 15 g, Rfl: 0    topiramate (TOPAMAX) 100 mg tablet, Take 100 mg by mouth (Patient not taking: Reported on 2/18/2025), Disp: , Rfl:     Current Allergies     Allergies as of 02/18/2025 - Reviewed 02/18/2025   Allergen Reaction Noted    Bee venom Swelling 05/14/2024            The following portions of the patient's history were reviewed and updated as appropriate: allergies, current medications, past family history, past medical history, past social history, past surgical history and problem list.     Past Medical History:   Diagnosis Date    Anxiety     Conductive hearing loss, bilateral 08/15/2023    Depression     Hypertension     Hypothyroid     Mental retardation        Past Surgical History:   Procedure Laterality Date    HERNIA REPAIR  1977    inguinal and umbilical    WISDOM TOOTH EXTRACTION         Family History   Problem Relation Age of Onset    Breast cancer Mother      "Arthritis Mother     Stroke Father     Hypertension Maternal Grandmother     Hypertension Maternal Grandfather     Hypertension Paternal Grandfather          Medications have been verified.        Objective   /85   Pulse 87   Temp (!) 96.6 °F (35.9 °C)   Resp 20   Ht 5' 4\" (1.626 m)   Wt 80.5 kg (177 lb 6.4 oz)   LMP 02/17/2025   SpO2 97%   BMI 30.45 kg/m²        Physical Exam     Physical Exam  Vitals and nursing note reviewed.   Constitutional:       General: She is not in acute distress.     Appearance: Normal appearance. She is not ill-appearing.   HENT:      Head: Normocephalic.      Right Ear: External ear normal.      Left Ear: External ear normal.      Nose: Nose normal.   Eyes:      Pupils: Pupils are equal, round, and reactive to light.   Cardiovascular:      Rate and Rhythm: Normal rate and regular rhythm.      Pulses: Normal pulses.      Heart sounds: Normal heart sounds.   Pulmonary:      Effort: Pulmonary effort is normal. No respiratory distress.      Breath sounds: Normal breath sounds. No stridor. No wheezing, rhonchi or rales.   Chest:      Chest wall: No tenderness.   Musculoskeletal:         General: Normal range of motion.      Cervical back: Normal range of motion and neck supple.   Lymphadenopathy:      Cervical: No cervical adenopathy.   Skin:     General: Skin is warm and dry.      Capillary Refill: Capillary refill takes less than 2 seconds.      Findings: Erythema and wound (left chin area abrasion with surrounding erythema) present.   Neurological:      General: No focal deficit present.      Mental Status: She is alert and oriented to person, place, and time.   Psychiatric:         Mood and Affect: Mood normal.         Behavior: Behavior normal.                   "

## 2025-02-27 DIAGNOSIS — E03.9 HYPOTHYROIDISM, UNSPECIFIED: ICD-10-CM

## 2025-02-27 DIAGNOSIS — I35.1 NONRHEUMATIC AORTIC (VALVE) INSUFFICIENCY: ICD-10-CM

## 2025-02-27 DIAGNOSIS — Z87.898 PERSONAL HISTORY OF OTHER SPECIFIED CONDITIONS: ICD-10-CM

## 2025-02-27 DIAGNOSIS — I71.22 ANEURYSM OF THE AORTIC ARCH, WITHOUT RUPTURE (HCC): ICD-10-CM

## 2025-04-04 ENCOUNTER — RESULTS FOLLOW-UP (OUTPATIENT)
Dept: FAMILY MEDICINE CLINIC | Facility: CLINIC | Age: 50
End: 2025-04-04

## 2025-04-04 ENCOUNTER — APPOINTMENT (OUTPATIENT)
Dept: LAB | Facility: CLINIC | Age: 50
End: 2025-04-04
Payer: COMMERCIAL

## 2025-04-04 DIAGNOSIS — E87.20 METABOLIC ACIDOSIS: ICD-10-CM

## 2025-04-04 DIAGNOSIS — F41.1 GENERALIZED ANXIETY DISORDER: ICD-10-CM

## 2025-04-04 DIAGNOSIS — F33.1 MAJOR DEPRESSIVE DISORDER, RECURRENT EPISODE, MODERATE (HCC): ICD-10-CM

## 2025-04-04 DIAGNOSIS — E03.9 HYPOTHYROIDISM, UNSPECIFIED TYPE: ICD-10-CM

## 2025-04-04 DIAGNOSIS — R61 HYPERHIDROSIS: ICD-10-CM

## 2025-04-04 LAB
25(OH)D3 SERPL-MCNC: >120 NG/ML (ref 30–100)
ANION GAP SERPL CALCULATED.3IONS-SCNC: 12 MMOL/L (ref 4–13)
BUN SERPL-MCNC: 15 MG/DL (ref 5–25)
CALCIUM SERPL-MCNC: 9.6 MG/DL (ref 8.4–10.2)
CHLORIDE SERPL-SCNC: 108 MMOL/L (ref 96–108)
CHOLEST SERPL-MCNC: 215 MG/DL (ref ?–200)
CO2 SERPL-SCNC: 19 MMOL/L (ref 21–32)
CREAT SERPL-MCNC: 0.88 MG/DL (ref 0.6–1.3)
GFR SERPL CREATININE-BSD FRML MDRD: 78 ML/MIN/1.73SQ M
GLUCOSE P FAST SERPL-MCNC: 79 MG/DL (ref 65–99)
HDLC SERPL-MCNC: 51 MG/DL
LDLC SERPL CALC-MCNC: 134 MG/DL (ref 0–100)
NONHDLC SERPL-MCNC: 164 MG/DL
POTASSIUM SERPL-SCNC: 3.6 MMOL/L (ref 3.5–5.3)
SODIUM SERPL-SCNC: 139 MMOL/L (ref 135–147)
T4 FREE SERPL-MCNC: 1.04 NG/DL (ref 0.61–1.12)
TRIGL SERPL-MCNC: 149 MG/DL (ref ?–150)
TSH SERPL DL<=0.05 MIU/L-ACNC: 1.83 UIU/ML (ref 0.45–4.5)

## 2025-04-04 PROCEDURE — 82306 VITAMIN D 25 HYDROXY: CPT

## 2025-04-04 PROCEDURE — 36415 COLL VENOUS BLD VENIPUNCTURE: CPT

## 2025-04-04 PROCEDURE — 80048 BASIC METABOLIC PNL TOTAL CA: CPT

## 2025-04-04 PROCEDURE — 80061 LIPID PANEL: CPT

## 2025-04-04 PROCEDURE — 84439 ASSAY OF FREE THYROXINE: CPT

## 2025-04-04 PROCEDURE — 84443 ASSAY THYROID STIM HORMONE: CPT

## 2025-04-07 ENCOUNTER — OFFICE VISIT (OUTPATIENT)
Dept: ENDOCRINOLOGY | Facility: CLINIC | Age: 50
End: 2025-04-07
Payer: COMMERCIAL

## 2025-04-07 VITALS
HEART RATE: 76 BPM | SYSTOLIC BLOOD PRESSURE: 130 MMHG | HEIGHT: 64 IN | BODY MASS INDEX: 30.39 KG/M2 | DIASTOLIC BLOOD PRESSURE: 72 MMHG | WEIGHT: 178 LBS | TEMPERATURE: 96.6 F

## 2025-04-07 DIAGNOSIS — E03.9 HYPOTHYROIDISM, UNSPECIFIED TYPE: Primary | ICD-10-CM

## 2025-04-07 PROCEDURE — 99213 OFFICE O/P EST LOW 20 MIN: CPT | Performed by: PHYSICIAN ASSISTANT

## 2025-04-07 NOTE — PROGRESS NOTES
Name: Elizabeth Bonilla      : 1975      MRN: [id number]  Encounter Provider: Amish Ge PA-C  Encounter Date: 2025   Encounter department: [facility]    No chief complaint on file.  :  Assessment & Plan  Hypothyroidism, unspecified type  Recent TFTs stable on increased dosing of LT4, without any concerning symptoms - to continue.   Reviewed ideal dosing of LT4 is in early AM at least 1 hour before other medications, food, however for ease of dosing / compliance it's likely best Elizabeth continues her current routine.   Recommend follow up TFTs in 6-12 months or as needed for concerning symptoms.   Reasonable to follow with endocrinology as needed moving forward.           History of Present Illness     Elizabeth Bonilla is a 49 y.o. female with hypothyroidism here for routine follow up visit.   She has a history of intellectual disability but lives independently.   She is accompanied by her friend and coworker, Adelina, who is helpful in contributing elements of history.    Elizabeth denies any major changes to her medical history. No recent acute illnesses or hospitalizations.     She reports sweating / heat intolerance still present but improved. Fatigue has also improved.      On chart review it appears her PCP recently adjusted levothyroxine from 100mcg daily to 125mcg daily on account of TSH > 10 on 24. She is not sure if she experienced any change in symptoms in the months preceding this abnormal lab.  Confirmed she is very compliant with levothyroxine dosing - takes early AM every day with other medications, just before breakfast.     No compressive neck symptoms.   No known history of thyroid cancer, thyroid nodules.           Review of Systems   All other systems reviewed and are negative.   as per HPI  Medical History Reviewed by provider this encounter:  Tobacco  Allergies  Meds  Problems  Med Hx  Surg Hx  Fam Hx     .     Medical History  "Reviewed by provider this encounter:     .    Objective   /72 (Patient Position: Sitting, Cuff Size: Standard)   Pulse 76   Temp (!) 96.6 °F (35.9 °C) (Temporal)   Ht 5' 4\" (1.626 m)   Wt 80.7 kg (178 lb)   BMI 30.55 kg/m²      Body mass index is 30.55 kg/m².  Wt Readings from Last 3 Encounters:   04/07/25 80.7 kg (178 lb)   02/18/25 80.5 kg (177 lb 6.4 oz)   11/24/24 81 kg (178 lb 9.2 oz)     Physical Exam  Vitals reviewed.   Constitutional:       General: She is not in acute distress.     Appearance: She is not ill-appearing.   HENT:      Head: Normocephalic.   Pulmonary:      Effort: No respiratory distress.   Neurological:      General: No focal deficit present.      Mental Status: She is alert.   Psychiatric:         Mood and Affect: Mood normal.         Labs:   Lab Results   Component Value Date    HGBA1C 5.1 10/15/2021     Lab Results   Component Value Date    CREATININE 0.88 04/04/2025    CREATININE 0.83 08/30/2024    CREATININE 0.8 08/29/2024    BUN 15 04/04/2025    K 3.6 04/04/2025     04/04/2025    CO2 19 (L) 04/04/2025     GFR, Calculated   Date Value Ref Range Status   08/28/2020 100 >60 mL/min/1.73m2 Final     Comment:     mL/min per 1.73 square meters                                            Normal Function or Mild Renal    Disease (if clinically at risk):  >or=60  Moderately Decreased:                30-59  Severely Decreased:                  15-29  Renal Failure:                         <15                                            -American GFR: multiply reported GFR by 1.16    Please note that the eGFR is based on the CKD-EPI calculation, and is not intended to be used for drug dosing.                                            Note: Calculated GFR may not be an accurate indicator of renal function if the patient's renal function is not in a steady state.     EGFR   Date Value Ref Range Status   08/29/2024 87 >=60 mL/min Final     Comment:     eGFR is calculated based " on the CKD-EPI 2021 equation.     eGFR   Date Value Ref Range Status   04/04/2025 78 ml/min/1.73sq m Final     Lab Results   Component Value Date    HDL 51 04/04/2025    TRIG 149 04/04/2025     Lab Results   Component Value Date    ALT 9 08/30/2024    AST 12 (L) 08/30/2024    ALKPHOS 87 08/30/2024     Lab Results   Component Value Date    BQS0IOIQLTUI 1.835 04/04/2025    ZPZ6WTPZBALI 13.385 (H) 08/30/2024    BOW5FRWXVBXQ 2.157 04/04/2024     Lab Results   Component Value Date    FREET4 1.04 04/04/2025       There are no Patient Instructions on file for this visit.    Discussed with the patient and all questioned fully answered. She will call me if any problems arise.

## 2025-04-08 NOTE — ASSESSMENT & PLAN NOTE
Recent TFTs stable on increased dosing of LT4, without any concerning symptoms - to continue.   Reviewed ideal dosing of LT4 is in early AM at least 1 hour before other medications, food, however for ease of dosing / compliance it's likely best Elizabeth continues her current routine.   Recommend follow up TFTs in 6-12 months or as needed for concerning symptoms.   Reasonable to follow with endocrinology as needed moving forward.

## 2025-04-21 ENCOUNTER — VBI (OUTPATIENT)
Dept: ADMINISTRATIVE | Facility: OTHER | Age: 50
End: 2025-04-21

## 2025-04-21 NOTE — TELEPHONE ENCOUNTER
04/21/25 7:27 AM     Chart reviewed for Pap Smear (HPV) aka Cervical Cancer Screening was/were not submitted to the patient's insurance.     Arielle Feliz MA   [facility]

## 2025-06-11 ENCOUNTER — APPOINTMENT (OUTPATIENT)
Dept: LAB | Facility: HOSPITAL | Age: 50
End: 2025-06-11
Payer: COMMERCIAL

## 2025-06-11 DIAGNOSIS — E03.9 HYPOTHYROIDISM, UNSPECIFIED TYPE: ICD-10-CM

## 2025-06-11 DIAGNOSIS — I35.1 MILD AORTIC VALVE REGURGITATION: ICD-10-CM

## 2025-06-11 DIAGNOSIS — I71.22 ANEURYSM OF AORTIC ARCH WITHOUT RUPTURE (HCC): ICD-10-CM

## 2025-06-11 DIAGNOSIS — Z87.898 HISTORY OF SEIZURES: ICD-10-CM

## 2025-06-11 LAB
ANION GAP SERPL CALCULATED.3IONS-SCNC: 6 MMOL/L (ref 4–13)
BUN SERPL-MCNC: 11 MG/DL (ref 5–25)
CALCIUM SERPL-MCNC: 9.1 MG/DL (ref 8.4–10.2)
CHLORIDE SERPL-SCNC: 110 MMOL/L (ref 96–108)
CO2 SERPL-SCNC: 20 MMOL/L (ref 21–32)
CREAT SERPL-MCNC: 0.86 MG/DL (ref 0.6–1.3)
GFR SERPL CREATININE-BSD FRML MDRD: 80 ML/MIN/1.73SQ M
GLUCOSE P FAST SERPL-MCNC: 85 MG/DL (ref 65–99)
POTASSIUM SERPL-SCNC: 4.1 MMOL/L (ref 3.5–5.3)
SODIUM SERPL-SCNC: 136 MMOL/L (ref 135–147)

## 2025-06-11 PROCEDURE — 36415 COLL VENOUS BLD VENIPUNCTURE: CPT

## 2025-06-11 PROCEDURE — 80048 BASIC METABOLIC PNL TOTAL CA: CPT

## 2025-06-20 ENCOUNTER — HOSPITAL ENCOUNTER (OUTPATIENT)
Dept: CT IMAGING | Facility: HOSPITAL | Age: 50
Discharge: HOME | End: 2025-06-20

## 2025-06-20 DIAGNOSIS — E03.9 HYPOTHYROIDISM, UNSPECIFIED: ICD-10-CM

## 2025-06-20 DIAGNOSIS — I35.1 NONRHEUMATIC AORTIC (VALVE) INSUFFICIENCY: ICD-10-CM

## 2025-06-20 DIAGNOSIS — R61 HYPERHIDROSIS: ICD-10-CM

## 2025-06-20 DIAGNOSIS — Z87.898 PERSONAL HISTORY OF OTHER SPECIFIED CONDITIONS: ICD-10-CM

## 2025-06-20 DIAGNOSIS — I71.22 ANEURYSM OF THE AORTIC ARCH, WITHOUT RUPTURE (HCC): ICD-10-CM

## 2025-06-20 RX ORDER — LEVOTHYROXINE SODIUM 125 UG/1
125 TABLET ORAL DAILY
Qty: 90 TABLET | Refills: 0 | Status: SHIPPED | OUTPATIENT
Start: 2025-06-20

## 2025-07-14 ENCOUNTER — HOSPITAL ENCOUNTER (OUTPATIENT)
Dept: CT IMAGING | Facility: HOSPITAL | Age: 50
Discharge: HOME | End: 2025-07-14
Attending: PHYSICIAN ASSISTANT
Payer: COMMERCIAL

## 2025-07-14 DIAGNOSIS — Z87.898 PERSONAL HISTORY OF OTHER SPECIFIED CONDITIONS: ICD-10-CM

## 2025-07-14 DIAGNOSIS — I71.22 ANEURYSM OF THE AORTIC ARCH, WITHOUT RUPTURE (HCC): ICD-10-CM

## 2025-07-14 DIAGNOSIS — I35.1 NONRHEUMATIC AORTIC (VALVE) INSUFFICIENCY: ICD-10-CM

## 2025-07-14 DIAGNOSIS — E03.9 HYPOTHYROIDISM, UNSPECIFIED: ICD-10-CM

## 2025-07-14 PROCEDURE — 71250 CT THORAX DX C-: CPT

## 2025-08-05 ENCOUNTER — HOSPITAL ENCOUNTER (EMERGENCY)
Facility: HOSPITAL | Age: 50
Discharge: HOME | End: 2025-08-05
Attending: EMERGENCY MEDICINE | Admitting: EMERGENCY MEDICINE
Payer: COMMERCIAL

## 2025-08-05 ENCOUNTER — APPOINTMENT (EMERGENCY)
Dept: RADIOLOGY | Facility: HOSPITAL | Age: 50
End: 2025-08-05
Payer: COMMERCIAL

## 2025-08-05 VITALS
OXYGEN SATURATION: 100 % | DIASTOLIC BLOOD PRESSURE: 87 MMHG | SYSTOLIC BLOOD PRESSURE: 130 MMHG | BODY MASS INDEX: 33.13 KG/M2 | TEMPERATURE: 96.4 F | WEIGHT: 180 LBS | HEIGHT: 62 IN | HEART RATE: 88 BPM | RESPIRATION RATE: 18 BRPM

## 2025-08-05 DIAGNOSIS — L03.116 CELLULITIS OF LEFT FOOT: Primary | ICD-10-CM

## 2025-08-05 DIAGNOSIS — L29.9 ITCHING: ICD-10-CM

## 2025-08-05 DIAGNOSIS — R21 RASH: ICD-10-CM

## 2025-08-05 LAB
ALBUMIN SERPL BCG-MCNC: 3.7 G/DL (ref 3.5–5)
ALP SERPL-CCNC: 83 U/L (ref 34–104)
ALT SERPL W P-5'-P-CCNC: 3 U/L (ref 7–52)
ANION GAP SERPL CALCULATED.3IONS-SCNC: 8 MMOL/L (ref 4–13)
AST SERPL W P-5'-P-CCNC: 10 U/L (ref 13–39)
BASOPHILS # BLD AUTO: 0.04 THOUSANDS/ÂΜL (ref 0–0.1)
BASOPHILS NFR BLD AUTO: 0 % (ref 0–1)
BILIRUB SERPL-MCNC: 0.42 MG/DL (ref 0.2–1)
BUN SERPL-MCNC: 8 MG/DL (ref 5–25)
CALCIUM SERPL-MCNC: 9 MG/DL (ref 8.4–10.2)
CHLORIDE SERPL-SCNC: 113 MMOL/L (ref 96–108)
CO2 SERPL-SCNC: 15 MMOL/L (ref 21–32)
CREAT SERPL-MCNC: 0.79 MG/DL (ref 0.6–1.3)
EOSINOPHIL # BLD AUTO: 0.23 THOUSAND/ÂΜL (ref 0–0.61)
EOSINOPHIL NFR BLD AUTO: 2 % (ref 0–6)
ERYTHROCYTE [DISTWIDTH] IN BLOOD BY AUTOMATED COUNT: 14.4 % (ref 11.6–15.1)
GFR SERPL CREATININE-BSD FRML MDRD: 88 ML/MIN/1.73SQ M
GLUCOSE SERPL-MCNC: 81 MG/DL (ref 65–140)
HCT VFR BLD AUTO: 44.9 % (ref 34.8–46.1)
HGB BLD-MCNC: 13.6 G/DL (ref 11.5–15.4)
IMM GRANULOCYTES # BLD AUTO: 0.04 THOUSAND/UL (ref 0–0.2)
IMM GRANULOCYTES NFR BLD AUTO: 0 % (ref 0–2)
LYMPHOCYTES # BLD AUTO: 3.7 THOUSANDS/ÂΜL (ref 0.6–4.47)
LYMPHOCYTES NFR BLD AUTO: 30 % (ref 14–44)
MCH RBC QN AUTO: 28.3 PG (ref 26.8–34.3)
MCHC RBC AUTO-ENTMCNC: 30.3 G/DL (ref 31.4–37.4)
MCV RBC AUTO: 94 FL (ref 82–98)
MONOCYTES # BLD AUTO: 0.94 THOUSAND/ÂΜL (ref 0.17–1.22)
MONOCYTES NFR BLD AUTO: 8 % (ref 4–12)
NEUTROPHILS # BLD AUTO: 7.51 THOUSANDS/ÂΜL (ref 1.85–7.62)
NEUTS SEG NFR BLD AUTO: 60 % (ref 43–75)
NRBC BLD AUTO-RTO: 0 /100 WBCS
PLATELET # BLD AUTO: 264 THOUSANDS/UL (ref 149–390)
PMV BLD AUTO: 11 FL (ref 8.9–12.7)
POTASSIUM SERPL-SCNC: 3.8 MMOL/L (ref 3.5–5.3)
PROT SERPL-MCNC: 7.6 G/DL (ref 6.4–8.4)
RBC # BLD AUTO: 4.8 MILLION/UL (ref 3.81–5.12)
SODIUM SERPL-SCNC: 136 MMOL/L (ref 135–147)
WBC # BLD AUTO: 12.46 THOUSAND/UL (ref 4.31–10.16)

## 2025-08-05 PROCEDURE — 36415 COLL VENOUS BLD VENIPUNCTURE: CPT | Performed by: EMERGENCY MEDICINE

## 2025-08-05 PROCEDURE — 85025 COMPLETE CBC W/AUTO DIFF WBC: CPT | Performed by: EMERGENCY MEDICINE

## 2025-08-05 PROCEDURE — 99284 EMERGENCY DEPT VISIT MOD MDM: CPT | Performed by: EMERGENCY MEDICINE

## 2025-08-05 PROCEDURE — 80053 COMPREHEN METABOLIC PANEL: CPT | Performed by: EMERGENCY MEDICINE

## 2025-08-05 PROCEDURE — 99283 EMERGENCY DEPT VISIT LOW MDM: CPT

## 2025-08-05 PROCEDURE — 73630 X-RAY EXAM OF FOOT: CPT

## 2025-08-05 RX ORDER — DIPHENHYDRAMINE HCL 25 MG
25 TABLET ORAL ONCE
Status: DISCONTINUED | OUTPATIENT
Start: 2025-08-05 | End: 2025-08-05 | Stop reason: HOSPADM

## 2025-08-05 RX ORDER — ACETAMINOPHEN 325 MG/1
650 TABLET ORAL ONCE
Status: COMPLETED | OUTPATIENT
Start: 2025-08-05 | End: 2025-08-05

## 2025-08-05 RX ORDER — CEPHALEXIN 500 MG/1
500 CAPSULE ORAL EVERY 6 HOURS SCHEDULED
Qty: 28 CAPSULE | Refills: 0 | Status: SHIPPED | OUTPATIENT
Start: 2025-08-05 | End: 2025-08-12

## 2025-08-05 RX ORDER — PREDNISONE 20 MG/1
20 TABLET ORAL DAILY
Qty: 5 TABLET | Refills: 0 | Status: SHIPPED | OUTPATIENT
Start: 2025-08-05 | End: 2025-08-10

## 2025-08-05 RX ADMIN — CEPHALEXIN 500 MG: 250 CAPSULE ORAL at 02:31

## 2025-08-05 RX ADMIN — ACETAMINOPHEN 650 MG: 325 TABLET ORAL at 02:05
